# Patient Record
Sex: FEMALE | Race: BLACK OR AFRICAN AMERICAN | NOT HISPANIC OR LATINO | Employment: OTHER | URBAN - METROPOLITAN AREA
[De-identification: names, ages, dates, MRNs, and addresses within clinical notes are randomized per-mention and may not be internally consistent; named-entity substitution may affect disease eponyms.]

---

## 2024-07-02 ENCOUNTER — HOSPITAL ENCOUNTER (OUTPATIENT)
Dept: GENERAL RADIOLOGY | Facility: HOSPITAL | Age: 69
Discharge: HOME | End: 2024-07-02
Payer: MEDICARE

## 2024-07-02 ENCOUNTER — PRE-ADMISSION TESTING (OUTPATIENT)
Dept: PREADMISSION TESTING | Facility: HOSPITAL | Age: 69
End: 2024-07-02
Payer: MEDICARE

## 2024-07-02 VITALS
TEMPERATURE: 97.8 F | RESPIRATION RATE: 16 BRPM | BODY MASS INDEX: 45.81 KG/M2 | HEART RATE: 61 BPM | OXYGEN SATURATION: 96 % | DIASTOLIC BLOOD PRESSURE: 78 MMHG | HEIGHT: 67 IN | SYSTOLIC BLOOD PRESSURE: 137 MMHG | WEIGHT: 291.9 LBS

## 2024-07-02 LAB
ALBUMIN SERPL-MCNC: 3.9 G/DL (ref 3.5–5.2)
ALBUMIN/GLOB SERPL: 1.3 G/DL
ALP SERPL-CCNC: 93 U/L (ref 39–117)
ALT SERPL W P-5'-P-CCNC: 14 U/L (ref 1–33)
ANION GAP SERPL CALCULATED.3IONS-SCNC: 10.1 MMOL/L (ref 5–15)
AST SERPL-CCNC: 18 U/L (ref 1–32)
BACTERIA UR QL AUTO: NORMAL /HPF
BILIRUB SERPL-MCNC: 0.4 MG/DL (ref 0–1.2)
BILIRUB UR QL STRIP: NEGATIVE
BUN SERPL-MCNC: 18 MG/DL (ref 8–23)
BUN/CREAT SERPL: 12.3 (ref 7–25)
CALCIUM SPEC-SCNC: 9.4 MG/DL (ref 8.6–10.5)
CHLORIDE SERPL-SCNC: 103 MMOL/L (ref 98–107)
CLARITY UR: CLEAR
CO2 SERPL-SCNC: 26.9 MMOL/L (ref 22–29)
COLOR UR: YELLOW
CREAT SERPL-MCNC: 1.46 MG/DL (ref 0.57–1)
DEPRECATED RDW RBC AUTO: 47.6 FL (ref 37–54)
EGFRCR SERPLBLD CKD-EPI 2021: 39.3 ML/MIN/1.73
ERYTHROCYTE [DISTWIDTH] IN BLOOD BY AUTOMATED COUNT: 15.8 % (ref 12.3–15.4)
GLOBULIN UR ELPH-MCNC: 3 GM/DL
GLUCOSE SERPL-MCNC: 87 MG/DL (ref 65–99)
GLUCOSE UR STRIP-MCNC: NEGATIVE MG/DL
HCT VFR BLD AUTO: 35.1 % (ref 34–46.6)
HGB BLD-MCNC: 11.4 G/DL (ref 12–15.9)
HGB UR QL STRIP.AUTO: NEGATIVE
HYALINE CASTS UR QL AUTO: NORMAL /LPF
INR PPP: 1.05 (ref 0.9–1.1)
KETONES UR QL STRIP: ABNORMAL
LEUKOCYTE ESTERASE UR QL STRIP.AUTO: ABNORMAL
MCH RBC QN AUTO: 27 PG (ref 26.6–33)
MCHC RBC AUTO-ENTMCNC: 32.5 G/DL (ref 31.5–35.7)
MCV RBC AUTO: 83.2 FL (ref 79–97)
NITRITE UR QL STRIP: NEGATIVE
PH UR STRIP.AUTO: 5.5 [PH] (ref 5–8)
PLATELET # BLD AUTO: 237 10*3/MM3 (ref 140–450)
PMV BLD AUTO: 9.7 FL (ref 6–12)
POTASSIUM SERPL-SCNC: 3.8 MMOL/L (ref 3.5–5.2)
PROT SERPL-MCNC: 6.9 G/DL (ref 6–8.5)
PROT UR QL STRIP: NEGATIVE
PROTHROMBIN TIME: 13.9 SECONDS (ref 11.7–14.2)
QT INTERVAL: 480 MS
QTC INTERVAL: 425 MS
RBC # BLD AUTO: 4.22 10*6/MM3 (ref 3.77–5.28)
RBC # UR STRIP: NORMAL /HPF
REF LAB TEST METHOD: NORMAL
SODIUM SERPL-SCNC: 140 MMOL/L (ref 136–145)
SP GR UR STRIP: 1.02 (ref 1–1.03)
SQUAMOUS #/AREA URNS HPF: NORMAL /HPF
UROBILINOGEN UR QL STRIP: ABNORMAL
WBC # UR STRIP: NORMAL /HPF
WBC NRBC COR # BLD AUTO: 4.64 10*3/MM3 (ref 3.4–10.8)

## 2024-07-02 PROCEDURE — 81001 URINALYSIS AUTO W/SCOPE: CPT

## 2024-07-02 PROCEDURE — 85027 COMPLETE CBC AUTOMATED: CPT

## 2024-07-02 PROCEDURE — 73560 X-RAY EXAM OF KNEE 1 OR 2: CPT

## 2024-07-02 PROCEDURE — 93010 ELECTROCARDIOGRAM REPORT: CPT | Performed by: INTERNAL MEDICINE

## 2024-07-02 PROCEDURE — 93005 ELECTROCARDIOGRAM TRACING: CPT

## 2024-07-02 PROCEDURE — 36415 COLL VENOUS BLD VENIPUNCTURE: CPT

## 2024-07-02 PROCEDURE — 80053 COMPREHEN METABOLIC PANEL: CPT

## 2024-07-02 PROCEDURE — 85610 PROTHROMBIN TIME: CPT

## 2024-07-02 PROCEDURE — 71046 X-RAY EXAM CHEST 2 VIEWS: CPT

## 2024-07-02 RX ORDER — MELOXICAM 15 MG/1
1 TABLET ORAL
COMMUNITY

## 2024-07-02 RX ORDER — TORSEMIDE 20 MG/1
60 TABLET ORAL DAILY
COMMUNITY

## 2024-07-02 RX ORDER — BUDESONIDE AND FORMOTEROL FUMARATE DIHYDRATE 80; 4.5 UG/1; UG/1
2 AEROSOL RESPIRATORY (INHALATION)
COMMUNITY
Start: 2024-05-17

## 2024-07-02 RX ORDER — LETROZOLE 2.5 MG/1
1 TABLET, FILM COATED ORAL DAILY
COMMUNITY
Start: 2024-05-22

## 2024-07-02 RX ORDER — FAMOTIDINE 40 MG/1
40 TABLET, FILM COATED ORAL AS NEEDED
COMMUNITY

## 2024-07-02 RX ORDER — LEVOTHYROXINE SODIUM 0.07 MG/1
75 TABLET ORAL DAILY
COMMUNITY

## 2024-07-02 RX ORDER — ERGOCALCIFEROL 1.25 MG/1
50000 CAPSULE ORAL
COMMUNITY
Start: 2024-06-25

## 2024-07-02 RX ORDER — NEBIVOLOL 10 MG/1
10 TABLET ORAL DAILY
COMMUNITY

## 2024-07-02 RX ORDER — ESCITALOPRAM OXALATE 20 MG/1
20 TABLET ORAL DAILY
COMMUNITY

## 2024-07-02 NOTE — DISCHARGE INSTRUCTIONS
Take the following medications the morning of surgery:    SYMBICORT, LEXAPRO, SYNTHROID, BYSTOLIC    If you are on prescription narcotic pain medication to control your pain you may also take that medication the morning of surgery.    General Instructions:  Do not eat solid food after midnight the night before surgery.  You may drink clear liquids day of surgery but must stop at least one hour before your hospital arrival time.  It is beneficial for you to have a clear drink that contains carbohydrates the day of surgery.  We suggest a 12 to 20 ounce bottle of Gatorade or Powerade for non-diabetic patients     Clear liquids are liquids you can see through.  Nothing red in color.     Plain water                               Sports drinks  Sodas                                   Gelatin (Jell-O)  Fruit juices without pulp such as white grape juice and apple juice  Popsicles that contain no fruit or yogurt  Tea or coffee (no cream or milk added)  Gatorade / Powerade  G2 / Powerade Zero    Patients who avoid smoking, chewing tobacco and alcohol for 4 weeks prior to surgery have a reduced risk of post-operative complications.  Quit smoking as many days before surgery as you can.  Do not smoke, use chewing tobacco or drink alcohol the day of surgery.   Bring any papers given to you in the doctor’s office.  Wear clean comfortable clothes.  Do not wear contact lenses, false eyelashes or make-up.  Bring a case for your glasses.   Bring  walker if applicable.  Remove all piercings.  Leave jewelry and any other valuables at home.  Hair extensions with metal clips must be removed prior to surgery.  The Pre-Admission Testing nurse will instruct you to bring medications if unable to obtain an accurate list in Pre-Admission Testing.            Preventing a Surgical Site Infection:  For 2 to 3 days before surgery, avoid shaving with a razor because the razor can irritate skin and make it easier to develop an infection.    Any  areas of open skin can increase the risk of a post-operative wound infection by allowing bacteria to enter and travel throughout the body.  Notify your surgeon if you have any skin wounds / rashes even if it is not near the expected surgical site.  The area will need assessed to determine if surgery should be delayed until it is healed.  The night prior to surgery shower using a fresh bar of anti-bacterial soap (such as Dial) and clean washcloth.  Sleep in a clean bed with clean clothing.  Do not allow pets to sleep with you.  Shower on the morning of surgery using a fresh bar of anti-bacterial soap (such as Dial) and clean washcloth.  Dry with a clean towel and dress in clean clothing.  Ask your surgeon if you will be receiving antibiotics prior to surgery.  Make sure you, your family, and all healthcare providers clean their hands with soap and water or an alcohol based hand  before caring for you or your wound.    Day of surgery:  Your arrival time is approximately two hours before your scheduled surgery time.  Upon arrival, a Pre-op nurse and Anesthesiologist will review your health history, obtain vital signs, and answer questions you may have.  The only belongings needed at this time will be a list of your home medications and if applicable your C-PAP/BI-PAP machine.  A Pre-op nurse will start an IV and you may receive medication in preparation for surgery, including something to help you relax.     Please be aware that surgery does come with discomfort.  We want to make every effort to control your discomfort so please discuss any uncontrolled symptoms with your nurse.   Your doctor will most likely have prescribed pain medications.      CHLORHEXIDINE CLOTH INSTRUCTIONS  The morning of surgery follow these instructions using the Chlorhexidine cloths you've been given.  These steps reduce bacteria on the body.  Do not use the cloths near your eyes, ears mouth, genitalia or on open wounds.  Throw the  cloths away after use but do not try to flush them down a toilet.      Open and remove one cloth at a time from the package.    Leave the cloth unfolded and begin the bathing.  Massage the skin with the cloths using gentle pressure to remove bacteria.  Do not scrub harshly.   Follow the steps below with one 2% CHG cloth per area (6 total cloths).  One cloth for neck, shoulders and chest.  One cloth for both arms, hands, fingers and underarms (do underarms last).  One cloth for the abdomen followed by groin.  One cloth for right leg and foot including between the toes.  One cloth for left leg and foot including between the toes.  The last cloth is to be used for the back of the neck, back and buttocks.    Allow the CHG to air dry 3 minutes on the skin which will give it time to work and decrease the chance of irritation.  The skin may feel sticky until it is dry.  Do not rinse with water or any other liquid or you will lose the beneficial effects of the CHG.  If mild skin irritation occurs, do rinse the skin to remove the CHG.  Report this to the nurse at time of admission.  Do not apply lotions, creams, ointments, deodorants or perfumes after using the clothes. Dress in clean clothes before coming to the hospital.    If you are staying overnight following surgery, you will be transported to your hospital room following the recovery period.  [facility] has all private rooms.    If you have any questions please call Pre-Admission Testing at [phone].  Deductibles and co-payments are collected on the day of service. Please be prepared to pay the required co-pay, deductible or deposit on the day of service as defined by your plan.    Call your surgeon immediately if you experience any of the following symptoms:  Sore Throat  Shortness of Breath or difficulty breathing  Cough  Chills  Body soreness or muscle pain  Headache  Fever  New loss of taste or smell  Do not arrive for your surgery ill.  Your  procedure will need to be rescheduled to another time.  You will need to call your physician before the day of surgery to avoid any unnecessary exposure to hospital staff as well as other patients.

## 2024-07-03 ENCOUNTER — ANESTHESIA EVENT (OUTPATIENT)
Dept: PERIOP | Facility: HOSPITAL | Age: 69
End: 2024-07-03
Payer: MEDICARE

## 2024-07-16 ENCOUNTER — ANESTHESIA (OUTPATIENT)
Dept: PERIOP | Facility: HOSPITAL | Age: 69
End: 2024-07-16
Payer: MEDICARE

## 2024-07-16 ENCOUNTER — APPOINTMENT (OUTPATIENT)
Dept: GENERAL RADIOLOGY | Facility: HOSPITAL | Age: 69
End: 2024-07-16
Payer: MEDICARE

## 2024-07-16 ENCOUNTER — HOSPITAL ENCOUNTER (OUTPATIENT)
Facility: HOSPITAL | Age: 69
Setting detail: OBSERVATION
Discharge: HOME | End: 2024-07-18
Attending: ORTHOPAEDIC SURGERY | Admitting: ORTHOPAEDIC SURGERY
Payer: MEDICARE

## 2024-07-16 DIAGNOSIS — M17.12 PRIMARY OSTEOARTHRITIS OF LEFT KNEE: Primary | ICD-10-CM

## 2024-07-16 PROCEDURE — G0378 HOSPITAL OBSERVATION PER HR: HCPCS

## 2024-07-16 PROCEDURE — 25010000002 ROPIVACAINE PER 1 MG: Performed by: ORTHOPAEDIC SURGERY

## 2024-07-16 PROCEDURE — C1776 JOINT DEVICE (IMPLANTABLE): HCPCS | Performed by: ORTHOPAEDIC SURGERY

## 2024-07-16 PROCEDURE — 25010000002 ONDANSETRON PER 1 MG: Performed by: NURSE ANESTHETIST, CERTIFIED REGISTERED

## 2024-07-16 PROCEDURE — A9270 NON-COVERED ITEM OR SERVICE: HCPCS | Performed by: NURSE ANESTHETIST, CERTIFIED REGISTERED

## 2024-07-16 PROCEDURE — A9270 NON-COVERED ITEM OR SERVICE: HCPCS | Performed by: ORTHOPAEDIC SURGERY

## 2024-07-16 PROCEDURE — 25010000002 SUGAMMADEX 200 MG/2ML SOLUTION: Performed by: ANESTHESIOLOGY

## 2024-07-16 PROCEDURE — 25010000002 CLINDAMYCIN 900 MG/50ML SOLUTION: Performed by: ORTHOPAEDIC SURGERY

## 2024-07-16 PROCEDURE — 63710000001 OXYCODONE-ACETAMINOPHEN 5-325 MG TABLET: Performed by: ORTHOPAEDIC SURGERY

## 2024-07-16 PROCEDURE — 25010000002 HYDROMORPHONE 1 MG/ML SOLUTION: Performed by: ANESTHESIOLOGY

## 2024-07-16 PROCEDURE — 25010000002 DEXAMETHASONE PER 1 MG: Performed by: NURSE ANESTHETIST, CERTIFIED REGISTERED

## 2024-07-16 PROCEDURE — 25010000002 FENTANYL CITRATE (PF) 50 MCG/ML SOLUTION: Performed by: NURSE ANESTHETIST, CERTIFIED REGISTERED

## 2024-07-16 PROCEDURE — C1713 ANCHOR/SCREW BN/BN,TIS/BN: HCPCS | Performed by: ORTHOPAEDIC SURGERY

## 2024-07-16 PROCEDURE — 25010000002 PROPOFOL 10 MG/ML EMULSION: Performed by: NURSE ANESTHETIST, CERTIFIED REGISTERED

## 2024-07-16 PROCEDURE — 63710000001 HYDROCODONE-ACETAMINOPHEN 5-325 MG TABLET: Performed by: NURSE ANESTHETIST, CERTIFIED REGISTERED

## 2024-07-16 PROCEDURE — 25010000002 FENTANYL CITRATE (PF) 50 MCG/ML SOLUTION: Performed by: STUDENT IN AN ORGANIZED HEALTH CARE EDUCATION/TRAINING PROGRAM

## 2024-07-16 PROCEDURE — 25010000002 MORPHINE PER 10 MG: Performed by: ORTHOPAEDIC SURGERY

## 2024-07-16 PROCEDURE — 25010000002 MIDAZOLAM PER 1 MG: Performed by: STUDENT IN AN ORGANIZED HEALTH CARE EDUCATION/TRAINING PROGRAM

## 2024-07-16 PROCEDURE — 25010000002 EPINEPHRINE 1 MG/ML SOLUTION 30 ML VIAL: Performed by: ORTHOPAEDIC SURGERY

## 2024-07-16 PROCEDURE — 73560 X-RAY EXAM OF KNEE 1 OR 2: CPT

## 2024-07-16 PROCEDURE — 25010000002 KETOROLAC TROMETHAMINE PER 15 MG: Performed by: ORTHOPAEDIC SURGERY

## 2024-07-16 PROCEDURE — 25010000002 ONDANSETRON PER 1 MG: Performed by: ANESTHESIOLOGY

## 2024-07-16 PROCEDURE — 25010000002 HYDROMORPHONE PER 4 MG: Performed by: NURSE ANESTHETIST, CERTIFIED REGISTERED

## 2024-07-16 PROCEDURE — 25810000003 LACTATED RINGERS PER 1000 ML: Performed by: STUDENT IN AN ORGANIZED HEALTH CARE EDUCATION/TRAINING PROGRAM

## 2024-07-16 DEVICE — IMPLANTABLE DEVICE
Type: IMPLANTABLE DEVICE | Site: KNEE | Status: FUNCTIONAL
Brand: BIOMET® KNEE SYSTEM

## 2024-07-16 DEVICE — IMPLANTABLE DEVICE
Type: IMPLANTABLE DEVICE | Site: KNEE | Status: FUNCTIONAL
Brand: VANGUARD® KNEE SYSTEM

## 2024-07-16 DEVICE — IMPLANTABLE DEVICE
Type: IMPLANTABLE DEVICE | Site: KNEE | Status: FUNCTIONAL
Brand: BIOMET® BONE CEMENT R

## 2024-07-16 DEVICE — CAP TOTL KN CMT PRIMARY: Type: IMPLANTABLE DEVICE | Status: FUNCTIONAL

## 2024-07-16 RX ORDER — ONDANSETRON 2 MG/ML
4 INJECTION INTRAMUSCULAR; INTRAVENOUS ONCE AS NEEDED
Status: COMPLETED | OUTPATIENT
Start: 2024-07-16 | End: 2024-07-16

## 2024-07-16 RX ORDER — OXYCODONE HYDROCHLORIDE AND ACETAMINOPHEN 5; 325 MG/1; MG/1
2 TABLET ORAL EVERY 4 HOURS PRN
Status: DISCONTINUED | OUTPATIENT
Start: 2024-07-16 | End: 2024-07-18 | Stop reason: HOSPADM

## 2024-07-16 RX ORDER — BUDESONIDE AND FORMOTEROL FUMARATE DIHYDRATE 160; 4.5 UG/1; UG/1
2 AEROSOL RESPIRATORY (INHALATION)
Status: DISCONTINUED | OUTPATIENT
Start: 2024-07-16 | End: 2024-07-16

## 2024-07-16 RX ORDER — NALOXONE HCL 0.4 MG/ML
0.2 VIAL (ML) INJECTION AS NEEDED
Status: DISCONTINUED | OUTPATIENT
Start: 2024-07-16 | End: 2024-07-16 | Stop reason: HOSPADM

## 2024-07-16 RX ORDER — CETIRIZINE HYDROCHLORIDE 10 MG/1
10 TABLET ORAL DAILY
Status: DISCONTINUED | OUTPATIENT
Start: 2024-07-16 | End: 2024-07-18 | Stop reason: HOSPADM

## 2024-07-16 RX ORDER — ACETAMINOPHEN 500 MG
1000 TABLET ORAL ONCE
Status: COMPLETED | OUTPATIENT
Start: 2024-07-16 | End: 2024-07-16

## 2024-07-16 RX ORDER — SODIUM CHLORIDE 0.9 % (FLUSH) 0.9 %
10 SYRINGE (ML) INJECTION EVERY 12 HOURS SCHEDULED
Status: DISCONTINUED | OUTPATIENT
Start: 2024-07-16 | End: 2024-07-18 | Stop reason: HOSPADM

## 2024-07-16 RX ORDER — BUDESONIDE AND FORMOTEROL FUMARATE DIHYDRATE 160; 4.5 UG/1; UG/1
2 AEROSOL RESPIRATORY (INHALATION)
Status: DISCONTINUED | OUTPATIENT
Start: 2024-07-16 | End: 2024-07-18 | Stop reason: HOSPADM

## 2024-07-16 RX ORDER — NEBIVOLOL 10 MG/1
10 TABLET ORAL DAILY
Status: DISCONTINUED | OUTPATIENT
Start: 2024-07-17 | End: 2024-07-18 | Stop reason: HOSPADM

## 2024-07-16 RX ORDER — DIAZEPAM 5 MG/1
5 TABLET ORAL 2 TIMES DAILY PRN
Status: DISCONTINUED | OUTPATIENT
Start: 2024-07-16 | End: 2024-07-18 | Stop reason: HOSPADM

## 2024-07-16 RX ORDER — HYDROCODONE BITARTRATE AND ACETAMINOPHEN 5; 325 MG/1; MG/1
1 TABLET ORAL ONCE AS NEEDED
Status: COMPLETED | OUTPATIENT
Start: 2024-07-16 | End: 2024-07-16

## 2024-07-16 RX ORDER — SODIUM CHLORIDE 0.9 % (FLUSH) 0.9 %
3 SYRINGE (ML) INJECTION EVERY 12 HOURS SCHEDULED
Status: DISCONTINUED | OUTPATIENT
Start: 2024-07-16 | End: 2024-07-16 | Stop reason: HOSPADM

## 2024-07-16 RX ORDER — SODIUM CHLORIDE 450 MG/100ML
100 INJECTION, SOLUTION INTRAVENOUS CONTINUOUS
Status: DISCONTINUED | OUTPATIENT
Start: 2024-07-16 | End: 2024-07-18 | Stop reason: HOSPADM

## 2024-07-16 RX ORDER — HYDRALAZINE HYDROCHLORIDE 20 MG/ML
5 INJECTION INTRAMUSCULAR; INTRAVENOUS
Status: DISCONTINUED | OUTPATIENT
Start: 2024-07-16 | End: 2024-07-16 | Stop reason: HOSPADM

## 2024-07-16 RX ORDER — OXYCODONE HYDROCHLORIDE AND ACETAMINOPHEN 5; 325 MG/1; MG/1
1 TABLET ORAL EVERY 4 HOURS PRN
Status: DISCONTINUED | OUTPATIENT
Start: 2024-07-16 | End: 2024-07-18 | Stop reason: HOSPADM

## 2024-07-16 RX ORDER — FERROUS SULFATE 325(65) MG
325 TABLET ORAL
Status: DISCONTINUED | OUTPATIENT
Start: 2024-07-17 | End: 2024-07-18 | Stop reason: HOSPADM

## 2024-07-16 RX ORDER — CLINDAMYCIN PHOSPHATE 900 MG/50ML
900 INJECTION, SOLUTION INTRAVENOUS ONCE
Status: COMPLETED | OUTPATIENT
Start: 2024-07-16 | End: 2024-07-16

## 2024-07-16 RX ORDER — DEXAMETHASONE SODIUM PHOSPHATE 4 MG/ML
INJECTION, SOLUTION INTRA-ARTICULAR; INTRALESIONAL; INTRAMUSCULAR; INTRAVENOUS; SOFT TISSUE AS NEEDED
Status: DISCONTINUED | OUTPATIENT
Start: 2024-07-16 | End: 2024-07-16 | Stop reason: SURG

## 2024-07-16 RX ORDER — PROMETHAZINE HYDROCHLORIDE 12.5 MG/1
12.5 TABLET ORAL EVERY 6 HOURS PRN
Status: DISCONTINUED | OUTPATIENT
Start: 2024-07-16 | End: 2024-07-18 | Stop reason: HOSPADM

## 2024-07-16 RX ORDER — PROMETHAZINE HYDROCHLORIDE 25 MG/1
25 TABLET ORAL ONCE AS NEEDED
Status: DISCONTINUED | OUTPATIENT
Start: 2024-07-16 | End: 2024-07-16 | Stop reason: HOSPADM

## 2024-07-16 RX ORDER — DIPHENHYDRAMINE HYDROCHLORIDE 50 MG/ML
12.5 INJECTION INTRAMUSCULAR; INTRAVENOUS EVERY 6 HOURS PRN
Status: DISCONTINUED | OUTPATIENT
Start: 2024-07-16 | End: 2024-07-18 | Stop reason: HOSPADM

## 2024-07-16 RX ORDER — DOCUSATE SODIUM 100 MG/1
100 CAPSULE, LIQUID FILLED ORAL 2 TIMES DAILY PRN
Status: DISCONTINUED | OUTPATIENT
Start: 2024-07-16 | End: 2024-07-18 | Stop reason: HOSPADM

## 2024-07-16 RX ORDER — DIPHENHYDRAMINE HYDROCHLORIDE 50 MG/ML
12.5 INJECTION INTRAMUSCULAR; INTRAVENOUS
Status: DISCONTINUED | OUTPATIENT
Start: 2024-07-16 | End: 2024-07-16 | Stop reason: HOSPADM

## 2024-07-16 RX ORDER — EPHEDRINE SULFATE 50 MG/ML
5 INJECTION, SOLUTION INTRAVENOUS ONCE AS NEEDED
Status: DISCONTINUED | OUTPATIENT
Start: 2024-07-16 | End: 2024-07-16 | Stop reason: HOSPADM

## 2024-07-16 RX ORDER — ASPIRIN 325 MG
325 TABLET, DELAYED RELEASE (ENTERIC COATED) ORAL 2 TIMES DAILY WITH MEALS
Qty: 60 TABLET | Refills: 0 | Status: SHIPPED | OUTPATIENT
Start: 2024-07-16

## 2024-07-16 RX ORDER — DROPERIDOL 2.5 MG/ML
0.62 INJECTION, SOLUTION INTRAMUSCULAR; INTRAVENOUS
Status: DISCONTINUED | OUTPATIENT
Start: 2024-07-16 | End: 2024-07-16 | Stop reason: HOSPADM

## 2024-07-16 RX ORDER — NALOXONE HCL 0.4 MG/ML
0.4 VIAL (ML) INJECTION
Status: DISCONTINUED | OUTPATIENT
Start: 2024-07-16 | End: 2024-07-18 | Stop reason: HOSPADM

## 2024-07-16 RX ORDER — ROCURONIUM BROMIDE 10 MG/ML
INJECTION, SOLUTION INTRAVENOUS AS NEEDED
Status: DISCONTINUED | OUTPATIENT
Start: 2024-07-16 | End: 2024-07-16 | Stop reason: SURG

## 2024-07-16 RX ORDER — TRANEXAMIC ACID 100 MG/ML
INJECTION, SOLUTION INTRAVENOUS AS NEEDED
Status: DISCONTINUED | OUTPATIENT
Start: 2024-07-16 | End: 2024-07-16 | Stop reason: SURG

## 2024-07-16 RX ORDER — LIDOCAINE HYDROCHLORIDE 20 MG/ML
INJECTION, SOLUTION INFILTRATION; PERINEURAL AS NEEDED
Status: DISCONTINUED | OUTPATIENT
Start: 2024-07-16 | End: 2024-07-16 | Stop reason: SURG

## 2024-07-16 RX ORDER — LEVOTHYROXINE SODIUM 0.07 MG/1
75 TABLET ORAL
Status: DISCONTINUED | OUTPATIENT
Start: 2024-07-17 | End: 2024-07-18 | Stop reason: HOSPADM

## 2024-07-16 RX ORDER — NIFEDIPINE 60 MG/1
60 TABLET, EXTENDED RELEASE ORAL DAILY
Status: DISCONTINUED | OUTPATIENT
Start: 2024-07-17 | End: 2024-07-18 | Stop reason: HOSPADM

## 2024-07-16 RX ORDER — MIDAZOLAM HYDROCHLORIDE 1 MG/ML
0.5 INJECTION INTRAMUSCULAR; INTRAVENOUS
Status: DISCONTINUED | OUTPATIENT
Start: 2024-07-16 | End: 2024-07-16 | Stop reason: HOSPADM

## 2024-07-16 RX ORDER — ACETAMINOPHEN 325 MG/1
325 TABLET ORAL EVERY 4 HOURS PRN
Status: DISCONTINUED | OUTPATIENT
Start: 2024-07-16 | End: 2024-07-18 | Stop reason: HOSPADM

## 2024-07-16 RX ORDER — DOCUSATE SODIUM 250 MG
250 CAPSULE ORAL 2 TIMES DAILY PRN
Qty: 30 CAPSULE | Refills: 1 | Status: SHIPPED | OUTPATIENT
Start: 2024-07-16

## 2024-07-16 RX ORDER — ESCITALOPRAM OXALATE 20 MG/1
20 TABLET ORAL DAILY
Status: DISCONTINUED | OUTPATIENT
Start: 2024-07-16 | End: 2024-07-18 | Stop reason: HOSPADM

## 2024-07-16 RX ORDER — OXYCODONE HYDROCHLORIDE AND ACETAMINOPHEN 5; 325 MG/1; MG/1
1-2 TABLET ORAL EVERY 4 HOURS PRN
Qty: 42 TABLET | Refills: 0 | Status: SHIPPED | OUTPATIENT
Start: 2024-07-16

## 2024-07-16 RX ORDER — FENTANYL CITRATE 50 UG/ML
50 INJECTION, SOLUTION INTRAMUSCULAR; INTRAVENOUS ONCE AS NEEDED
Status: COMPLETED | OUTPATIENT
Start: 2024-07-16 | End: 2024-07-16

## 2024-07-16 RX ORDER — LIDOCAINE HYDROCHLORIDE 10 MG/ML
0.5 INJECTION, SOLUTION INFILTRATION; PERINEURAL ONCE AS NEEDED
Status: DISCONTINUED | OUTPATIENT
Start: 2024-07-16 | End: 2024-07-16 | Stop reason: HOSPADM

## 2024-07-16 RX ORDER — ONDANSETRON 4 MG/1
4 TABLET, ORALLY DISINTEGRATING ORAL EVERY 6 HOURS PRN
Status: DISCONTINUED | OUTPATIENT
Start: 2024-07-16 | End: 2024-07-18 | Stop reason: HOSPADM

## 2024-07-16 RX ORDER — ONDANSETRON 2 MG/ML
4 INJECTION INTRAMUSCULAR; INTRAVENOUS EVERY 6 HOURS PRN
Status: DISCONTINUED | OUTPATIENT
Start: 2024-07-16 | End: 2024-07-18 | Stop reason: HOSPADM

## 2024-07-16 RX ORDER — FENTANYL CITRATE 50 UG/ML
50 INJECTION, SOLUTION INTRAMUSCULAR; INTRAVENOUS
Status: DISCONTINUED | OUTPATIENT
Start: 2024-07-16 | End: 2024-07-16 | Stop reason: HOSPADM

## 2024-07-16 RX ORDER — ONDANSETRON 2 MG/ML
INJECTION INTRAMUSCULAR; INTRAVENOUS AS NEEDED
Status: DISCONTINUED | OUTPATIENT
Start: 2024-07-16 | End: 2024-07-16 | Stop reason: SURG

## 2024-07-16 RX ORDER — CLINDAMYCIN PHOSPHATE 900 MG/50ML
900 INJECTION, SOLUTION INTRAVENOUS EVERY 8 HOURS
Qty: 100 ML | Refills: 0 | Status: COMPLETED | OUTPATIENT
Start: 2024-07-16 | End: 2024-07-17

## 2024-07-16 RX ORDER — SODIUM CHLORIDE 0.9 % (FLUSH) 0.9 %
3-10 SYRINGE (ML) INJECTION AS NEEDED
Status: DISCONTINUED | OUTPATIENT
Start: 2024-07-16 | End: 2024-07-16 | Stop reason: HOSPADM

## 2024-07-16 RX ORDER — TORSEMIDE 20 MG/1
60 TABLET ORAL DAILY
Status: DISCONTINUED | OUTPATIENT
Start: 2024-07-16 | End: 2024-07-18 | Stop reason: HOSPADM

## 2024-07-16 RX ORDER — CELECOXIB 200 MG/1
200 CAPSULE ORAL ONCE
Status: DISCONTINUED | OUTPATIENT
Start: 2024-07-16 | End: 2024-07-16

## 2024-07-16 RX ORDER — SODIUM CHLORIDE 0.9 % (FLUSH) 0.9 %
1-10 SYRINGE (ML) INJECTION AS NEEDED
Status: DISCONTINUED | OUTPATIENT
Start: 2024-07-16 | End: 2024-07-18 | Stop reason: HOSPADM

## 2024-07-16 RX ORDER — MAGNESIUM HYDROXIDE 1200 MG/15ML
LIQUID ORAL AS NEEDED
Status: DISCONTINUED | OUTPATIENT
Start: 2024-07-16 | End: 2024-07-16 | Stop reason: HOSPADM

## 2024-07-16 RX ORDER — FLUMAZENIL 0.1 MG/ML
0.2 INJECTION INTRAVENOUS AS NEEDED
Status: DISCONTINUED | OUTPATIENT
Start: 2024-07-16 | End: 2024-07-16 | Stop reason: HOSPADM

## 2024-07-16 RX ORDER — SODIUM CHLORIDE, SODIUM LACTATE, POTASSIUM CHLORIDE, CALCIUM CHLORIDE 600; 310; 30; 20 MG/100ML; MG/100ML; MG/100ML; MG/100ML
9 INJECTION, SOLUTION INTRAVENOUS CONTINUOUS
Status: DISCONTINUED | OUTPATIENT
Start: 2024-07-16 | End: 2024-07-18 | Stop reason: HOSPADM

## 2024-07-16 RX ORDER — PROPOFOL 10 MG/ML
VIAL (ML) INTRAVENOUS AS NEEDED
Status: DISCONTINUED | OUTPATIENT
Start: 2024-07-16 | End: 2024-07-16 | Stop reason: SURG

## 2024-07-16 RX ORDER — LETROZOLE 2.5 MG/1
2.5 TABLET, FILM COATED ORAL DAILY
Status: DISCONTINUED | OUTPATIENT
Start: 2024-07-16 | End: 2024-07-18 | Stop reason: HOSPADM

## 2024-07-16 RX ORDER — KETOROLAC TROMETHAMINE 15 MG/ML
15 INJECTION, SOLUTION INTRAMUSCULAR; INTRAVENOUS EVERY 6 HOURS PRN
Status: DISCONTINUED | OUTPATIENT
Start: 2024-07-16 | End: 2024-07-18 | Stop reason: HOSPADM

## 2024-07-16 RX ORDER — MORPHINE SULFATE 2 MG/ML
4 INJECTION, SOLUTION INTRAMUSCULAR; INTRAVENOUS
Status: ACTIVE | OUTPATIENT
Start: 2024-07-16 | End: 2024-07-17

## 2024-07-16 RX ORDER — FAMOTIDINE 20 MG/1
40 TABLET, FILM COATED ORAL DAILY PRN
Status: DISCONTINUED | OUTPATIENT
Start: 2024-07-16 | End: 2024-07-18 | Stop reason: HOSPADM

## 2024-07-16 RX ORDER — SODIUM CHLORIDE 9 MG/ML
40 INJECTION, SOLUTION INTRAVENOUS AS NEEDED
Status: DISCONTINUED | OUTPATIENT
Start: 2024-07-16 | End: 2024-07-18 | Stop reason: HOSPADM

## 2024-07-16 RX ORDER — ASPIRIN 325 MG
325 TABLET, DELAYED RELEASE (ENTERIC COATED) ORAL 2 TIMES DAILY WITH MEALS
Status: DISCONTINUED | OUTPATIENT
Start: 2024-07-17 | End: 2024-07-18 | Stop reason: HOSPADM

## 2024-07-16 RX ORDER — OXYCODONE AND ACETAMINOPHEN 7.5; 325 MG/1; MG/1
1 TABLET ORAL EVERY 4 HOURS PRN
Status: DISCONTINUED | OUTPATIENT
Start: 2024-07-16 | End: 2024-07-16 | Stop reason: HOSPADM

## 2024-07-16 RX ORDER — DIPHENHYDRAMINE HCL 25 MG
25 CAPSULE ORAL EVERY 6 HOURS PRN
Status: DISCONTINUED | OUTPATIENT
Start: 2024-07-16 | End: 2024-07-18 | Stop reason: HOSPADM

## 2024-07-16 RX ORDER — HYDROMORPHONE HYDROCHLORIDE 1 MG/ML
0.5 INJECTION, SOLUTION INTRAMUSCULAR; INTRAVENOUS; SUBCUTANEOUS
Status: DISCONTINUED | OUTPATIENT
Start: 2024-07-16 | End: 2024-07-16 | Stop reason: HOSPADM

## 2024-07-16 RX ORDER — PROMETHAZINE HYDROCHLORIDE 25 MG/1
25 SUPPOSITORY RECTAL ONCE AS NEEDED
Status: DISCONTINUED | OUTPATIENT
Start: 2024-07-16 | End: 2024-07-16 | Stop reason: HOSPADM

## 2024-07-16 RX ORDER — LABETALOL HYDROCHLORIDE 5 MG/ML
5 INJECTION, SOLUTION INTRAVENOUS
Status: DISCONTINUED | OUTPATIENT
Start: 2024-07-16 | End: 2024-07-16 | Stop reason: HOSPADM

## 2024-07-16 RX ORDER — IPRATROPIUM BROMIDE AND ALBUTEROL SULFATE 2.5; .5 MG/3ML; MG/3ML
3 SOLUTION RESPIRATORY (INHALATION) ONCE AS NEEDED
Status: DISCONTINUED | OUTPATIENT
Start: 2024-07-16 | End: 2024-07-16 | Stop reason: HOSPADM

## 2024-07-16 RX ORDER — NIFEDIPINE 60 MG/1
60 TABLET, EXTENDED RELEASE ORAL DAILY
COMMUNITY

## 2024-07-16 RX ORDER — FENTANYL CITRATE 50 UG/ML
INJECTION, SOLUTION INTRAMUSCULAR; INTRAVENOUS AS NEEDED
Status: DISCONTINUED | OUTPATIENT
Start: 2024-07-16 | End: 2024-07-16 | Stop reason: SURG

## 2024-07-16 RX ADMIN — PROPOFOL 140 MG: 10 INJECTION, EMULSION INTRAVENOUS at 13:38

## 2024-07-16 RX ADMIN — PROPOFOL 25 MCG/KG/MIN: 10 INJECTION, EMULSION INTRAVENOUS at 14:33

## 2024-07-16 RX ADMIN — TRANEXAMIC ACID 1000 MG: 100 INJECTION, SOLUTION INTRAVENOUS at 13:57

## 2024-07-16 RX ADMIN — FENTANYL CITRATE 50 MCG: 50 INJECTION, SOLUTION INTRAMUSCULAR; INTRAVENOUS at 15:52

## 2024-07-16 RX ADMIN — ROCURONIUM BROMIDE 50 MG: 10 INJECTION, SOLUTION INTRAVENOUS at 13:38

## 2024-07-16 RX ADMIN — LIDOCAINE HYDROCHLORIDE 60 MG: 20 INJECTION, SOLUTION INFILTRATION; PERINEURAL at 13:38

## 2024-07-16 RX ADMIN — ONDANSETRON 4 MG: 2 INJECTION INTRAMUSCULAR; INTRAVENOUS at 15:48

## 2024-07-16 RX ADMIN — FENTANYL CITRATE 50 MCG: 50 INJECTION, SOLUTION INTRAMUSCULAR; INTRAVENOUS at 13:59

## 2024-07-16 RX ADMIN — ACETAMINOPHEN 1000 MG: 500 TABLET ORAL at 11:19

## 2024-07-16 RX ADMIN — Medication 10 ML: at 23:49

## 2024-07-16 RX ADMIN — CLINDAMYCIN PHOSPHATE 900 MG: 900 INJECTION, SOLUTION INTRAVENOUS at 13:28

## 2024-07-16 RX ADMIN — FENTANYL CITRATE 50 MCG: 50 INJECTION, SOLUTION INTRAMUSCULAR; INTRAVENOUS at 16:16

## 2024-07-16 RX ADMIN — HYDROMORPHONE HYDROCHLORIDE 0.5 MG: 1 INJECTION, SOLUTION INTRAMUSCULAR; INTRAVENOUS; SUBCUTANEOUS at 16:02

## 2024-07-16 RX ADMIN — ONDANSETRON 4 MG: 2 INJECTION INTRAMUSCULAR; INTRAVENOUS at 14:52

## 2024-07-16 RX ADMIN — KETOROLAC TROMETHAMINE 15 MG: 15 INJECTION, SOLUTION INTRAMUSCULAR; INTRAVENOUS at 20:26

## 2024-07-16 RX ADMIN — FENTANYL CITRATE 50 MCG: 50 INJECTION, SOLUTION INTRAMUSCULAR; INTRAVENOUS at 12:22

## 2024-07-16 RX ADMIN — SODIUM CHLORIDE 100 ML/HR: 4.5 INJECTION, SOLUTION INTRAVENOUS at 20:27

## 2024-07-16 RX ADMIN — SUGAMMADEX 200 MG: 100 INJECTION, SOLUTION INTRAVENOUS at 15:03

## 2024-07-16 RX ADMIN — MIDAZOLAM 0.5 MG: 1 INJECTION INTRAMUSCULAR; INTRAVENOUS at 12:22

## 2024-07-16 RX ADMIN — HYDROCODONE BITARTRATE AND ACETAMINOPHEN 1 TABLET: 5; 325 TABLET ORAL at 16:16

## 2024-07-16 RX ADMIN — OXYCODONE HYDROCHLORIDE AND ACETAMINOPHEN 2 TABLET: 5; 325 TABLET ORAL at 20:27

## 2024-07-16 RX ADMIN — Medication 3 ML: at 13:29

## 2024-07-16 RX ADMIN — CLINDAMYCIN PHOSPHATE 900 MG: 900 INJECTION, SOLUTION INTRAVENOUS at 15:08

## 2024-07-16 RX ADMIN — HYDROMORPHONE HYDROCHLORIDE 0.5 MG: 1 INJECTION, SOLUTION INTRAMUSCULAR; INTRAVENOUS; SUBCUTANEOUS at 14:52

## 2024-07-16 RX ADMIN — DEXAMETHASONE SODIUM PHOSPHATE 8 MG: 4 INJECTION, SOLUTION INTRA-ARTICULAR; INTRALESIONAL; INTRAMUSCULAR; INTRAVENOUS; SOFT TISSUE at 13:54

## 2024-07-16 RX ADMIN — SODIUM CHLORIDE, POTASSIUM CHLORIDE, SODIUM LACTATE AND CALCIUM CHLORIDE 9 ML/HR: 600; 310; 30; 20 INJECTION, SOLUTION INTRAVENOUS at 12:37

## 2024-07-16 NOTE — H&P
"  Orthopaedic Surgery History and Physical    Patient Name:  Gela Holden  YOB: 1955   Age: 68 y.o.  Medical Records Number:  [id number]    Date of Admission:  7/16/2024 10:36 AM    Chief Complaint:  No admission diagnoses are documented for this encounter.    Gela Holden is a 68 y.o. female who presents c/o severe left knee pain.  The pain has been on and off for many years, worsening to the point where the pain is becoming disabling.  The pain is a constant dull ache with occasional sharp, stabbing pain.  The patient has failed conservative treatment and would like to proceed with left total knee arthroplasty.    /78 (BP Location: Left arm)   Pulse 55   Temp 97.5 °F (36.4 °C) (Oral)   Resp 18   Ht 170.2 cm (67.01\")   Wt 132 kg (291 lb 0.1 oz)   SpO2 98%   BMI 45.57 kg/m²     Past Medical History:    Past Medical History:   Diagnosis Date    Anesthesia     DOES NOT LIKE MASK ON FACE    Arthritis     At risk for sleep apnea     STOP BANG 5    Cancer 12/2021    BREAST RT    Cellulitis     Chronic back pain     Depression     Disease of thyroid gland     Exercise intolerance     GERD (gastroesophageal reflux disease)     Hypertension     Knee pain     RONY    Limited joint range of motion     LEFT    Stress incontinence     Varicose veins of left lower extremity     Vitamin D deficiency        Past Surgical History:   Past Surgical History:   Procedure Laterality Date    BREAST BIOPSY Right 12/2021    BREAST LUMPECTOMY Right 02/2022    COLONOSCOPY  2019    TUBAL COAGULATION LAPAROSCOPIC Bilateral     VENTRAL HERNIA REPAIR  03/2015       Social History:    Social History     Socioeconomic History    Marital status: Single   Tobacco Use    Smoking status: Never    Smokeless tobacco: Never   Vaping Use    Vaping status: Never Used   Substance and Sexual Activity    Alcohol use: Not Currently     Comment: OCCAS    Drug use: Never    Sexual activity: Defer       Family History:  "   Family History   Problem Relation Age of Onset    Malig Hyperthermia Neg Hx        Current Medications:  Scheduled Meds:ceFAZolin, 3,000 mg, Intravenous, Once  celecoxib, 200 mg, Oral, Once  ethyl alcohol, 2 Swab, Nasal, Once  ropivacaine 0.5 % 50 mL, Morphine 5 mg, ketorolac 30 mg, EPINEPHrine 0.3 mg in sodium chloride 0.9 % 50 mL solution, , Injection, Once  sodium chloride, 3 mL, Intravenous, Q12H      Continuous Infusions:lactated ringers, 9 mL/hr      PRN Meds:.  fentanyl    lidocaine    midazolam    sodium chloride    Current Facility-Administered Medications:     ceFAZolin 3000 mg IVPB in 100 mL NS (MBP), 3,000 mg, Intravenous, Once, Robert Glover MD    celecoxib (CeleBREX) capsule 200 mg, 200 mg, Oral, Once, Robert Glover MD    ethyl alcohol 62 % 2 each, 2 Swab, Nasal, Once, Robert Glover MD    fentaNYL citrate (PF) (SUBLIMAZE) injection 50 mcg, 50 mcg, Intravenous, Once PRN, González Salguero MD    lactated ringers infusion, 9 mL/hr, Intravenous, Continuous, González Salguero MD    lidocaine (XYLOCAINE) 1 % injection 0.5 mL, 0.5 mL, Intradermal, Once PRN, González Salguero MD    midazolam (VERSED) injection 0.5 mg, 0.5 mg, Intravenous, Q5 Min PRN, González Salguero MD    ropivacaine 0.5 % 50 mL, Morphine 5 mg, ketorolac 30 mg, EPINEPHrine 0.3 mg in sodium chloride 0.9 % 50 mL solution, , Injection, Once, Robert Glover MD    sodium chloride 0.9 % flush 3 mL, 3 mL, Intravenous, Q12H, González Salguero MD    sodium chloride 0.9 % flush 3-10 mL, 3-10 mL, Intravenous, PRN, González Salguero MD    Allergies:    Allergies   Allergen Reactions    Penicillins Anaphylaxis     RASH, SWELLING OF THROAT, SHORTNESS OF BREATH    Sulfa Antibiotics Diarrhea     Had body aches       Review of Systems:   HEENT: Patient denies any headaches, vision changes, change in hearing, or tinnitus, Patient denies any rhinorrhea,epistaxis, sinus pain, mouth or dental problems, sore throat or hoarseness, or  dysphagia  Pulmonary: Patient denies any cough, congestion, SOA, or wheezing  Cardiovascular: Patient denies any chest pain, dyspnea, palpitations, weakness, intolerance of exercise, varicosities, swelling of extremities, known murmur  Gastrointestinal:  Patient denies nausea, vomiting, diarrhea, constipation, loss  of appetite, change in appetite, dysphagia, gas, heartburn, melena, change in bowel habits, use of laxatives or other drugs to alter the function of the gastrointestinal tract.  Genital/Urinary: Patient denies dysuria, change in color of urine, change in frequency of urination, pain with urgency, incontinence, retention, or nocturia.  Musculoskeletal: Patient denies increased warmth; redness; or swelling of joints; limitation of function; deformity; crepitation: pain in a joint or an extremity, the neck, or the back, especially with movement.  Neurological: Patient denies dizziness, tremor, ataxia, difficulty in speaking, change in speech, paresthesia, loss of sensation, seizures, syncope, changes in memory.  Endocrine system: Patient denies tremors, palpitations, intolerance of heat or cold, polyuria, polydipsia, polyphagia, diaphoresis, exophthalmos, or goiter.  Psychological: Patient denies thoughts/plans or harming self or other; depression,  insomnia, night terrors, josette, memory loss, disorientation.  Skin: Patient denies any bruising, rashes, discoloration, pruritus, wounds, ulcers, decubiti, changes in the hair or nails  Hematopoietic: Patient denies history of spontaneous or excessive bleeding, epistaxis, hematuria, melena, fatigue, enlarged or tender lymph nodes, pallor, history of anemia.        Physical Exam:   Awake, A&O x3, affect normal, no acute distress  Ambulating with a limp due to knee pain  Knee ROM is limited due to pain (5-115)  Strength is 4/5 in the quad, hamstring and calf  Cap refill is normal, Sensation intact    Card:  RR, HD Stable  Pulm:  Regular breathing, no S.O.A  Abd:   Soft, NT, ND    Lab Results (last 24 hours)       ** No results found for the last 24 hours. **            XR Chest PA & Lateral    Result Date: 7/4/2024  Narrative: XR CHEST PA AND LATERAL-   INDICATION: Preoperative  COMPARISON: None  TECHNIQUE: 2 view chest  FINDINGS:  Linear opacity in the left midlung. No effusions. Normal mediastinal contour. Heart is normal in size.      Impression: Small amount of subsegmental atelectasis in the left upper lobe  This report was finalized on 7/4/2024 3:17 PM by Dr. Stephane Angelo M.D on Workstation: [id number]      XR Knee 1 or 2 View Left    Result Date: 7/4/2024  Narrative: XR KNEE 1 OR 2 VW LEFT-   INDICATION: Preoperative  COMPARISON: None  TECHNIQUE: 2 view left knee  FINDINGS:  No fracture. No bone lesion. No dislocation. Osteoarthritis, severe in the medial compartment with joint space loss and osteophytosis. Patellofemoral joint osteophytosis. Preserved lateral compartment. No effusion.      Impression: Osteoarthritis, severe in the medial compartment  This report was finalized on 7/4/2024 1:23 PM by Dr. Stephane Angelo M.D on Workstation: [id number]         Assessment:  End-stage Primary Left Knee Osteoarthritis    Plan:  Patient's pain is becoming disabling, despite extensive conservative treatment.  Radiographs reveal end-stage degenerative changes.  The risks of surgery, including, but not limited to, heart attack, stroke, dying, DVT, nerve injury, vascular injury, arthrofibrosis and infection were discussed.  The alternatives and benefits were also discussed.  All questions answered and the patient wishes to proceed with left total knee arthroplasty.    Robert ROLONC  [facility]  [address] [place] [address]  [phone]    7/16/2024    CC: Orion Live MD, Robert Glover MD

## 2024-07-16 NOTE — ANESTHESIA PREPROCEDURE EVALUATION
Anesthesia Evaluation     Patient summary reviewed and Nursing notes reviewed   no history of anesthetic complications:   NPO Solid Status: > 8 hours  NPO Liquid Status: > 2 hours           Airway   Mallampati: II  TM distance: >3 FB  Neck ROM: full  Dental      Pulmonary    Cardiovascular     ECG reviewed    (+) hypertension      Neuro/Psych  (+) psychiatric history  GI/Hepatic/Renal/Endo    (+) morbid obesity, GERD, renal disease- CRI, thyroid problem     Musculoskeletal     Abdominal    Substance History      OB/GYN          Other   arthritis,                       Anesthesia Plan    ASA 3     general     intravenous induction     Anesthetic plan, risks, benefits, and alternatives have been provided, discussed and informed consent has been obtained with: patient.        CODE STATUS:

## 2024-07-16 NOTE — DISCHARGE SUMMARY
Orthopaedic Surgery Discharge Summary    Patient Name:  Gela Holden  YOB: 1955  Age: 68 y.o.  Medical Records Number:  [id number]    Date of Admission:  7/16/2024  Date of Discharge:  7/17/2024    Primary Discharge Diagnosis:  Primary osteoarthritis of left knee [M17.12]    Secondary Discharge Diagnosis:    Problems Addressed this Visit          Musculoskeletal and Injuries    * (Principal) Primary osteoarthritis of left knee - Primary    Relevant Medications    oxyCODONE-acetaminophen (PERCOCET) 5-325 MG per tablet     Diagnoses         Codes Comments    Primary osteoarthritis of left knee    -  Primary ICD-10-CM: M17.12  ICD-9-CM: 715.16             Procedures Performed:  Left Total Knee Arthroplasty      Hospital Course:    Gela Holden is a 68 y.o.  female who underwent successful left tka on 7/16/2024.  Gela Holden was started on Aspirin 325 mg po twice daily post-operatively for DVT prophylaxis.  On post-op day 1 the patients dressing was changed and their incision was clean, with no signs of infection and their calf was soft, with no signs of DVT.  The patient progressed well with physical therapy and the patients hemoglobin remained stable. On post-operative day 1 the patient was felt ready for discharge.     Vitals:  Vitals:    07/16/24 1545 07/16/24 1600 07/16/24 1615 07/16/24 1630   BP: 133/68 139/49 140/79 144/74   BP Location:       Patient Position:       Pulse: 83 69 74 64   Resp: 15 15 15 15   Temp:       TempSrc:       SpO2: 99% 96% 100% 97%   Weight:       Height:           Discharge Medications:      Discharge Medications        New Medications        Instructions Start Date   aspirin 325 MG tablet  Commonly known as: Matheus Aspirin   325 mg, Oral, 2 Times Daily With Meals      docusate sodium 250 MG capsule  Commonly known as: COLACE   250 mg, Oral, 2 Times Daily PRN      oxyCODONE-acetaminophen 5-325 MG per tablet  Commonly known as: PERCOCET   1-2 tablets,  Oral, Every 4 Hours PRN             Continue These Medications        Instructions Start Date   budesonide-formoterol 80-4.5 MCG/ACT inhaler  Commonly known as: SYMBICORT   2 puffs, Inhalation, 2 Times Daily - RT      Cetirizine HCl 10 MG capsule   10 mg, Oral, As Needed      escitalopram 20 MG tablet  Commonly known as: LEXAPRO   20 mg, Oral, Daily      famotidine 40 MG tablet  Commonly known as: PEPCID   40 mg, Oral, As Needed      letrozole 2.5 MG tablet  Commonly known as: FEMARA   1 tablet, Oral, Daily      levothyroxine 75 MCG tablet  Commonly known as: SYNTHROID, LEVOTHROID   75 mcg, Oral, Daily      nebivolol 10 MG tablet  Commonly known as: BYSTOLIC   10 mg, Oral, Daily      NIFEdipine XL 60 MG 24 hr tablet  Commonly known as: PROCARDIA XL   60 mg, Oral, Daily      torsemide 20 MG tablet  Commonly known as: DEMADEX   60 mg, Oral, Daily, TAKES 3  20 MG TABLETS      vitamin D 1.25 MG (95390 UT) capsule capsule  Commonly known as: ERGOCALCIFEROL   50,000 Units, Oral, Every 7 Days, TAKES ON SUNDAY             Stop These Medications      meloxicam 15 MG tablet  Commonly known as: MOBIC              Pain Medications:  Percocet 5/325 mg 1-2 po q 4-6 hours prn pain    DVT Prophylaxis:  Enteric Coated Aspirin 325 mg po twice daily for 2 weeks, then one daily for 4 weeks      Total Knee Joint Replacement Discharge Instructions:    I. ACTIVITIES:  1. Exercises:  Complete exercise program as taught by the hospital physical therapist 2 times per day  Exercise program will be advanced by the physical therapist  During the day be up ambulating every 2 hours (while awake) for short distances  Complete the ankle pump exercises at least 10 times per hour (while awake)  Elevate legs most of the day the first week post operatively and thereafter elevate legs when in bed and for at least 30 minutes during the day. Caution must be taken to avoid pillow placement under the bend of the knee as this can led to flexion contractures  of the knee.  Use cold packs 20-30 minutes approximately 5 times per day. This should be done before and after completing your exercises and at any time you are experiencing pain/ stiffness in your operative extremity.      2. Activities of Daily Living:  No tub baths, hot tubs, or swimming pools for 4 weeks  May shower and let water run over the incision on post-operative day #5 if no drainage. Do not scrub or rub the incision. Simply let the water run over the incision and pat dry.    II. Restrictions  Do not cross legs or kneel  Your surgeon will discuss with you when you will be able to drive again.  Weight bearing is as tolerated  First week stay inside on even terrain. May go up and down stairs one stair at a time utilizing the hand rail  After one week, you may venture outside.    III. Precautions:  Everyone that comes near you should wash their hands  No elective dental, genital-urinary, or colon procedures or surgical procedures for 12 weeks after surgery unless absolutely necessary.   If dental work or surgical procedure is deemed absolutely necessary, you will need to contact your surgeon as you will need to take antibiotics 1 hour prior to any dental work (including teeth cleanings).  Please discuss with your surgeon prophylactic antibiotics as the length of time this intervention will be necessary for you varies with each patient’s health history and situation.  Avoid sick people. If you must be around someone who is ill, they should wear a mask.  Avoid visits to the Emergency Room or Urgent Care unless you are having a life threatening event.   If ordered stockings are to be placed on in the morning and removed at night. Monitor the stockings to ensure that any swelling is not causing the stockings to become too tight. In this case, remove stockings immediately.    IV. INCISION CARE:  Wash your hands prior to dressing changes  Change the dressing as needed to keep incision clean and dry. Utilize dry  gauze and paper tape. Avoid touching the side of the gauze that goes against the incision with your hands.  No creams or ointments to the incision  May remove dressing once the incision is free of drainage  Do not touch or pick at the incision  Check incision every day and notify surgeon immediately if any of the following signs or symptoms are noted:  Increase in redness  Increase in swelling around the incision and of the entire extremity  Increase in pain  Drainage oozing from the incision  Pulling apart of the edges of the incision  Increase in overall body temperature (greater than 100.5 degrees)  Your surgeon will instruct you regarding suture or staple removal    V. Medications:   1. Anticoagulants: You will be discharged on an anticoagulant. This is a prophylactic medication that helps prevent blood clots during your post-operative period. The type and length of dosage varies based on your individual needs, procedure performed, and surgeon’s preference.  While taking the anticoagulant, you should avoid taking any additional aspirin, ibuprofen (Advil or Motrin), Aleve (Naprosyn) or other non-steroidal anti-inflammatory medications.   Notify surgeon immediately if any davion bleeding is noted in the urine, stool, emesis, or from the nose or the incision. Blood in the stool will often appear as black rather than red. Blood in urine may appear as pink. Blood in emesis may appear as brown/black like coffee grounds.  You will need to apply pressure for longer periods of time to any cuts or abrasions to stop bleeding  Avoid alcohol while taking anticoagulants    2. Stool Softeners: You will be at greater risk of constipation after surgery due to being less mobile and the pain medications.   Take stool softeners as instructed by your surgeon while on pain medications. Over the counter Colace 100 mg 1-2 capsules twice daily.   If stools become too loose or too frequent, please decreases the dosage or stop the stool  softener.  If constipation occurs despite use of stool softeners, you are to continue the stool softeners and add a laxative (Milk of Magnesia 1 ounce daily as needed)  Drink plenty of fluids, and eat fruits and vegetables during your recovery time    3. Pain Medications utilized after surgery are narcotics and the law requires that the following information be given to all patients that are prescribed narcotics:  CLASSIFICATION: Pain medications are called Opioids and are narcotics  LEGALITIES: It is illegal to share narcotics with others and to drive within 24 hours of taking narcotics  POTENTIAL SIDE EFFECTS: Potential side effects of opioids include: nausea, vomiting, itching, dizziness, drowsiness, dry mouth, constipation, and difficulty urinating.  POTENTIAL ADVERSE EFFECTS:   Opioid tolerance can develop with use of pain medications and this simply means that it requires more and more of the medication to control pain; however, this is seen more in patients that use opioids for longer periods of time.  Opioid dependence can develop with use of Opioids and this simply means that to stop the medication can cause withdrawal symptoms; however, this is seen with patients that use Opioids for longer periods of time.  Opioid addiction can develop with use of Opioids and the incidence of this is very unlikely in patients who take the medications as ordered and stop the medications as instructed.  Opioid overdose can be dangerous, but is unlikely when the medication is taken as ordered and stopped when ordered. It is important not to mix opioids with alcohol or with and type of sedative such as Benadryl as this can lead to over sedation and respiratory difficulty.  DOSAGE:   Pain medications will need to be taken consistently for the first week to decrease pain and promote adequate pain relief and participation in physical therapy.  After the initial surgical pain begins to resolve, you may begin to decrease the pain  medication. By the end of 6-8 weeks, you should be off of pain medications.  Refills will not be given by the office during evening hours, on weekends, or after 6-8 weeks post-op.  To seek refills on pain medications during the initial 6 week post-operative period, you must call the office 48 hours in advance to request the refill. The office will then notify you when to  the prescription. DO NOT wait until you are out of the medication to request a refill.    V. FOLLOW-UP VISITS:  You will need to follow up in the office with your surgeon in 3 weeks. Please call this number [phone] to schedule this appointment.  If you have any concerns or suspected complications prior to your follow up visit, please call your surgeons office. Do not wait until your appointment time if you suspect complications. These will need to be addressed in the office promptly.    Robert Mcelroy PA-C  [facility]  [address] [place] [address]  [phone]    7/16/2024    CC:Orion Live MD:Robert Glover MD

## 2024-07-16 NOTE — OP NOTE
Orthopaedic Surgery Operative Note    Patient Name:  Gela Holden  YOB: 1955  Age: 68 y.o.  Medical Records Number:  [id number]    Date of Procedure:  7/16/2024    Pre-operative Diagnosis:  Primary Osteoarthritis Left Knee    Post-operative Diagnosis:  Primary Osteoarthritis Left Knee    Procedure Performed:  Left Total Knee Arthroplasty    Surgical Approach: Knee Mid-Vastus    Implants:  Biomet Vanguard TKA, 65 Femoral Component, 71Tibial Tray with a 40 mm Modular Stem, 31 3-Peg Patella, 16 mm Standard Polyethylene Insert    Surgeon:  Robert Glover M.D.    Assistant:  (who was present during the critical portions of the case, thereby decreasing operative time and patient morbidity)    Anesthetic Type:  General    Estimated Blood Loss:  250cc's    Specimens: * No orders in the log *    No Complications      Indications for Procedure:  Gela Holden is a 68 y.o. female suffering from end stage degenerative changes in the left knee.  The patients pain is becoming disabling, despite extensive conservative care, including NSAIDS, therapy and injections. The risks, benefits and alternatives were discussed and the patient wishes to proceed with left total knee arthroplasty.      Procedure Performed:    After informed consent was obtained, the correct patient identified, the correct operative side marked by the operative surgeon, and pre-operative IV  given (prior to tourniquet inflation), the patient was taken to the operating room and placed supine on the operating table.  After general anesthesia was induced, a surgical time out was performed and 1 gm of Tranxemic Acid given, a well-padded tourniquet was placed and the patient's left lower extremity was prepped with chloraprep and draped in a sterile fashion.    A midline incision was made overlying the left knee and we sharply dissected down to expose the parapatellar retinaculum.  A muscle sparing, mid-vastus, parapatellar arthrotomy was  performed and we elevated the soft tissue both medially and laterally.  The menisci and ACL were excised.  We everted the patella and measured this to be 22 mm and we removed 7 mm of bone down to 15 mm.  We measured the patella to be 31 and drilled our three drill holes.  We protected the patella with the patella protector and turned our attention to the femur and the tibia.    We drilled drill holes into the femoral and the tibial intramedullary canals and proceeded to irrigate the canals to remove the fatty marrow.  We used the intramedullary marisela and the 5 degree valgus cut block to make our distal femoral cut.  Once we had a smooth surface, we measured the femur to be 65.  We assured we had rotational alignment using the epicondylar axis, then we used the four-in-one cut block to make our anterior, posterior, anterior and posterior chamfer cuts.  We placed our femoral trial, had excellent fit, extended the knee and marked Meigs's line on the tibia.      We then turned our attention to the tibia, where we irrigated the canal again.  We used the intramedullary marisela and the 3 degree posterior sloped cut block to remove 2 mm of bone from the effected side of the tibia.  Prior to making our cut, we assured we had rotational alignment using the external guide and Candy's line.  Once we had a smooth surface, we measured the tibia to be 71.  We then removed soft tissue and bony debris from the posterior aspect of the knee.    We placed our trial implants and had excellent fit, range of motion, stability and ligament balance, throughout a complete arc of motion with a 16 std polyethylene liner.  We removed our trial implants, punched the tibial keel, copiously irrigated the knee, then cemented our implants in place using Biomet cement.  Once the cement cured, we trialed again with the 14, 16 and 18 AS, standard and posterior lipped polyethylene liners.  The 16 std gave us the best range of motion, stability and  "ligament balance, throughout a complete arc of motion.  We removed the trials, copiously irrigated the knee with dilute betadine solution, gave IV antibiotics and local anesthetic, then placed our permanent polyethylene liner.  We placed a 1/8\" hemovac drain, then closed the arthrotomy with #1 Vicryl pop-off sutures.  The subcutaneous tissue was closed with 2-0 vicryl pop-off sutures.  The skin was closed with staples.  We placed a sterile dressing of Xeroform, 4x4's, abdominal pads, cast padding and an Ace Wrap.  All sponge and needle counts were correct.  The patient was then awakened from general anesthesia and taken to the recovery room in stable condition.    The patient will be started on anticoagulation for DVT prophylaxis.  IV antibiotics will be discontinued within 24 hours of surgery.  Immediately prior to surgery, there were no acute Thromboembolic nor Cardiovascular risk factors.  An updated Medical Reconciliation form is on the chart.    Robert Mcelroy PA-C  [facility]  [address] [place] [address]  [phone]    7/16/2024      "

## 2024-07-16 NOTE — ANESTHESIA POSTPROCEDURE EVALUATION
"Patient: Gela Holden    Procedure Summary       Date: 07/16/24 Room / Location:  [facility] [address] [place] [address] [facility]    Anesthesia Start: 1332 Anesthesia Stop: 1539    Procedure: TOTAL KNEE ARTHROPLASTY (Left: Knee) Diagnosis:     Surgeons: Robert Glover MD Provider: Pito Gomez MD    Anesthesia Type: general ASA Status: 3            Anesthesia Type: general    Vitals  Vitals Value Taken Time   /68 07/16/24 1545   Temp     Pulse 83 07/16/24 1550   Resp     SpO2 100 % 07/16/24 1550   Vitals shown include unfiled device data.        Post Anesthesia Care and Evaluation    Patient location during evaluation: bedside  Pain management: adequate    Airway patency: patent  Anesthetic complications: No anesthetic complications    Cardiovascular status: acceptable  Respiratory status: acceptable  Hydration status: acceptable    Comments: */78 (BP Location: Left arm)   Pulse 55   Temp 36.4 °C (97.5 °F) (Oral)   Resp 18   Ht 170.2 cm (67.01\")   Wt 132 kg (291 lb 0.1 oz)   SpO2 98%   BMI 45.57 kg/m²       "

## 2024-07-16 NOTE — ANESTHESIA PROCEDURE NOTES
Airway  Urgency: elective    Date/Time: 7/16/2024 1:44 PM  Airway not difficult    General Information and Staff    Patient location during procedure: OR  CRNA/CAA: Valarie Coburn CRNA    Indications and Patient Condition  Indications for airway management: airway protection    Preoxygenated: yes  Mask difficulty assessment: 2 - vent by mask + OA or adjuvant +/- NMBA    Final Airway Details  Final airway type: endotracheal airway      Successful airway: ETT  Cuffed: yes   Successful intubation technique: direct laryngoscopy  Facilitating devices/methods: intubating stylet  Endotracheal tube insertion site: oral  Blade: Leisa  Blade size: 3  ETT size (mm): 7.0  Cormack-Lehane Classification: grade I - full view of glottis  Placement verified by: chest auscultation and capnometry   Measured from: lips  ETT/EBT  to lips (cm): 22  Number of attempts at approach: 1  Assessment: lips, teeth, and gum same as pre-op and atraumatic intubation

## 2024-07-17 LAB
ANION GAP SERPL CALCULATED.3IONS-SCNC: 13.5 MMOL/L (ref 5–15)
BUN SERPL-MCNC: 18 MG/DL (ref 8–23)
BUN/CREAT SERPL: 11.7 (ref 7–25)
CALCIUM SPEC-SCNC: 8.7 MG/DL (ref 8.6–10.5)
CHLORIDE SERPL-SCNC: 99 MMOL/L (ref 98–107)
CO2 SERPL-SCNC: 21.5 MMOL/L (ref 22–29)
CREAT SERPL-MCNC: 1.54 MG/DL (ref 0.57–1)
DEPRECATED RDW RBC AUTO: 44.9 FL (ref 37–54)
EGFRCR SERPLBLD CKD-EPI 2021: 36.9 ML/MIN/1.73
ERYTHROCYTE [DISTWIDTH] IN BLOOD BY AUTOMATED COUNT: 14.5 % (ref 12.3–15.4)
GLUCOSE SERPL-MCNC: 135 MG/DL (ref 65–99)
HCT VFR BLD AUTO: 33.2 % (ref 34–46.6)
HGB BLD-MCNC: 10.8 G/DL (ref 12–15.9)
MCH RBC QN AUTO: 27.1 PG (ref 26.6–33)
MCHC RBC AUTO-ENTMCNC: 32.5 G/DL (ref 31.5–35.7)
MCV RBC AUTO: 83.2 FL (ref 79–97)
PLATELET # BLD AUTO: 215 10*3/MM3 (ref 140–450)
PMV BLD AUTO: 10.5 FL (ref 6–12)
POTASSIUM SERPL-SCNC: 3.9 MMOL/L (ref 3.5–5.2)
RBC # BLD AUTO: 3.99 10*6/MM3 (ref 3.77–5.28)
SODIUM SERPL-SCNC: 134 MMOL/L (ref 136–145)
WBC NRBC COR # BLD AUTO: 8.82 10*3/MM3 (ref 3.4–10.8)

## 2024-07-17 PROCEDURE — 63710000001 NIFEDIPINE XL 60 MG TABLET SUSTAINED-RELEASE 24 HOUR: Performed by: ORTHOPAEDIC SURGERY

## 2024-07-17 PROCEDURE — A9270 NON-COVERED ITEM OR SERVICE: HCPCS | Performed by: ORTHOPAEDIC SURGERY

## 2024-07-17 PROCEDURE — 63710000001 OXYCODONE-ACETAMINOPHEN 5-325 MG TABLET: Performed by: ORTHOPAEDIC SURGERY

## 2024-07-17 PROCEDURE — 63710000001 ASPIRIN 325 MG TABLET DELAYED-RELEASE: Performed by: ORTHOPAEDIC SURGERY

## 2024-07-17 PROCEDURE — 63710000001 BUDESONIDE-FORMOTEROL 160-4.5 MCG/ACT AEROSOL 6 G INHALER: Performed by: ORTHOPAEDIC SURGERY

## 2024-07-17 PROCEDURE — 63710000001 LEVOTHYROXINE 75 MCG TABLET: Performed by: ORTHOPAEDIC SURGERY

## 2024-07-17 PROCEDURE — 80048 BASIC METABOLIC PNL TOTAL CA: CPT | Performed by: ORTHOPAEDIC SURGERY

## 2024-07-17 PROCEDURE — 25010000002 KETOROLAC TROMETHAMINE PER 15 MG: Performed by: ORTHOPAEDIC SURGERY

## 2024-07-17 PROCEDURE — 94799 UNLISTED PULMONARY SVC/PX: CPT

## 2024-07-17 PROCEDURE — 63710000001 ESCITALOPRAM 20 MG TABLET: Performed by: ORTHOPAEDIC SURGERY

## 2024-07-17 PROCEDURE — 63710000001 DIAZEPAM 5 MG TABLET: Performed by: ORTHOPAEDIC SURGERY

## 2024-07-17 PROCEDURE — 94664 DEMO&/EVAL PT USE INHALER: CPT

## 2024-07-17 PROCEDURE — G0378 HOSPITAL OBSERVATION PER HR: HCPCS

## 2024-07-17 PROCEDURE — 25010000002 CLINDAMYCIN 900 MG/50ML SOLUTION: Performed by: ORTHOPAEDIC SURGERY

## 2024-07-17 PROCEDURE — 63710000001 FERROUS SULFATE 325 (65 FE) MG TABLET: Performed by: ORTHOPAEDIC SURGERY

## 2024-07-17 PROCEDURE — 63710000001 NEBIVOLOL 10 MG TABLET: Performed by: ORTHOPAEDIC SURGERY

## 2024-07-17 PROCEDURE — 94640 AIRWAY INHALATION TREATMENT: CPT

## 2024-07-17 PROCEDURE — 63710000001 LETROZOLE 2.5 MG TABLET: Performed by: ORTHOPAEDIC SURGERY

## 2024-07-17 PROCEDURE — 85027 COMPLETE CBC AUTOMATED: CPT | Performed by: ORTHOPAEDIC SURGERY

## 2024-07-17 PROCEDURE — 97162 PT EVAL MOD COMPLEX 30 MIN: CPT

## 2024-07-17 PROCEDURE — 63710000001 TORSEMIDE 20 MG TABLET: Performed by: ORTHOPAEDIC SURGERY

## 2024-07-17 PROCEDURE — 97530 THERAPEUTIC ACTIVITIES: CPT

## 2024-07-17 RX ADMIN — NEBIVOLOL 10 MG: 10 TABLET ORAL at 08:12

## 2024-07-17 RX ADMIN — OXYCODONE HYDROCHLORIDE AND ACETAMINOPHEN 2 TABLET: 5; 325 TABLET ORAL at 12:44

## 2024-07-17 RX ADMIN — CLINDAMYCIN PHOSPHATE 900 MG: 900 INJECTION, SOLUTION INTRAVENOUS at 00:28

## 2024-07-17 RX ADMIN — OXYCODONE HYDROCHLORIDE AND ACETAMINOPHEN 2 TABLET: 5; 325 TABLET ORAL at 07:54

## 2024-07-17 RX ADMIN — Medication 10 ML: at 21:27

## 2024-07-17 RX ADMIN — LETROZOLE 2.5 MG: 2.5 TABLET ORAL at 08:11

## 2024-07-17 RX ADMIN — FERROUS SULFATE TAB 325 MG (65 MG ELEMENTAL FE) 325 MG: 325 (65 FE) TAB at 08:11

## 2024-07-17 RX ADMIN — ASPIRIN 325 MG: 325 TABLET, COATED ORAL at 17:02

## 2024-07-17 RX ADMIN — BUDESONIDE AND FORMOTEROL FUMARATE DIHYDRATE 2 PUFF: 160; 4.5 AEROSOL RESPIRATORY (INHALATION) at 10:10

## 2024-07-17 RX ADMIN — NIFEDIPINE 60 MG: 60 TABLET, FILM COATED, EXTENDED RELEASE ORAL at 08:12

## 2024-07-17 RX ADMIN — TORSEMIDE 60 MG: 20 TABLET ORAL at 08:12

## 2024-07-17 RX ADMIN — OXYCODONE HYDROCHLORIDE AND ACETAMINOPHEN 2 TABLET: 5; 325 TABLET ORAL at 21:26

## 2024-07-17 RX ADMIN — ESCITALOPRAM 20 MG: 20 TABLET, FILM COATED ORAL at 08:11

## 2024-07-17 RX ADMIN — ASPIRIN 325 MG: 325 TABLET, COATED ORAL at 08:10

## 2024-07-17 RX ADMIN — BUDESONIDE AND FORMOTEROL FUMARATE DIHYDRATE 2 PUFF: 160; 4.5 AEROSOL RESPIRATORY (INHALATION) at 20:41

## 2024-07-17 RX ADMIN — OXYCODONE HYDROCHLORIDE AND ACETAMINOPHEN 2 TABLET: 5; 325 TABLET ORAL at 17:03

## 2024-07-17 RX ADMIN — OXYCODONE HYDROCHLORIDE AND ACETAMINOPHEN 2 TABLET: 5; 325 TABLET ORAL at 03:45

## 2024-07-17 RX ADMIN — DIAZEPAM 5 MG: 5 TABLET ORAL at 03:45

## 2024-07-17 RX ADMIN — LEVOTHYROXINE SODIUM 75 MCG: 75 TABLET ORAL at 07:54

## 2024-07-17 RX ADMIN — Medication 10 ML: at 08:13

## 2024-07-17 RX ADMIN — KETOROLAC TROMETHAMINE 15 MG: 15 INJECTION, SOLUTION INTRAMUSCULAR; INTRAVENOUS at 03:45

## 2024-07-17 RX ADMIN — CLINDAMYCIN PHOSPHATE 900 MG: 900 INJECTION, SOLUTION INTRAVENOUS at 09:49

## 2024-07-17 NOTE — PROGRESS NOTES
Orthopaedic Surgery  Progress Note  7/17/2024    Patients Name:  Gela Holden  YOB: 1955  Age: 68 y.o.  Medical Records Number:  [id number]  Date of Admission: 7/16/2024    No complaints except appropriate pain and stiffness in the left knee    Vitals:  Vitals:    07/16/24 1708 07/16/24 2100 07/17/24 0210 07/17/24 0552   BP: 120/79 130/72 121/57 120/68   BP Location: Left arm Left arm Left arm Left arm   Patient Position: Lying Lying Lying Lying   Pulse: 70 60 61 62   Resp: 16 17 16 16   Temp: 98.8 °F (37.1 °C) 97.7 °F (36.5 °C) 97.7 °F (36.5 °C) 97.7 °F (36.5 °C)   TempSrc: Oral Oral Oral Oral   SpO2: 98% 100% 100% 99%   Weight:       Height:           LLE:  NVI, calf nontender, Sensation intact  No signs of DVT    Incision: clean, no signs of infection    Lab Results (last 24 hours)       Procedure Component Value Units Date/Time    Basic Metabolic Panel [[id number]]  (Abnormal) Collected: 07/17/24 0433    Specimen: Blood Updated: 07/17/24 0527     Glucose 135 mg/dL      BUN 18 mg/dL      Creatinine 1.54 mg/dL      Sodium 134 mmol/L      Potassium 3.9 mmol/L      Comment: Slight hemolysis detected by analyzer. Result may be falsely elevated.        Chloride 99 mmol/L      CO2 21.5 mmol/L      Calcium 8.7 mg/dL      BUN/Creatinine Ratio 11.7     Anion Gap 13.5 mmol/L      eGFR 36.9 mL/min/1.73     Narrative:      GFR Normal >60  Chronic Kidney Disease <60  Kidney Failure <15      CBC (No Diff) [[id number]]  (Abnormal) Collected: 07/17/24 0434    Specimen: Blood Updated: 07/17/24 0501     WBC 8.82 10*3/mm3      RBC 3.99 10*6/mm3      Hemoglobin 10.8 g/dL      Hematocrit 33.2 %      MCV 83.2 fL      MCH 27.1 pg      MCHC 32.5 g/dL      RDW 14.5 %      RDW-SD 44.9 fl      MPV 10.5 fL      Platelets 215 10*3/mm3             XR Knee 1 or 2 View Left    Result Date: 7/16/2024  Narrative: 2 RADIOGRAPHIC VIEWS OF THE LEFT KNEE  CLINICAL HISTORY: Postop left knee arthroplasty.  FINDINGS:  2 radiographic  views of the left knee demonstrate recent postoperative changes from a left knee arthroplasty. The arthroplasty prosthesis appears appropriately positioned. There is no significant periprosthetic lucency. No fracture is identified. Typical recent postoperative changes are noted within the adjacent soft tissues.  This report was finalized on 7/16/2024 4:19 PM by Dr. Gabriel Jeronimo M.D on Workstation: [id number]      XR Chest PA & Lateral    Result Date: 7/4/2024  Narrative: XR CHEST PA AND LATERAL-   INDICATION: Preoperative  COMPARISON: None  TECHNIQUE: 2 view chest  FINDINGS:  Linear opacity in the left midlung. No effusions. Normal mediastinal contour. Heart is normal in size.      Impression: Small amount of subsegmental atelectasis in the left upper lobe  This report was finalized on 7/4/2024 3:17 PM by Dr. Stephane Angelo M.D on Workstation: [id number]      XR Knee 1 or 2 View Left    Result Date: 7/4/2024  Narrative: XR KNEE 1 OR 2 VW LEFT-   INDICATION: Preoperative  COMPARISON: None  TECHNIQUE: 2 view left knee  FINDINGS:  No fracture. No bone lesion. No dislocation. Osteoarthritis, severe in the medial compartment with joint space loss and osteophytosis. Patellofemoral joint osteophytosis. Preserved lateral compartment. No effusion.      Impression: Osteoarthritis, severe in the medial compartment  This report was finalized on 7/4/2024 1:23 PM by Dr. Stephane Angelo M.D on Workstation: [id number]       Assesment/Plan:    Procedures:  Left TKA  Postoperative Day: 1  Weightbearing Status:  WBAT with walker  DVT Prophylaxis:  ASA for DVT prophylaxis    Disposition:  Home with home health after PT tomorrow, if comfortable and mobilizing safely    Robert Mcelroy PA-C  [facility]  [address] [place] [address]  [phone]    7/17/2024

## 2024-07-17 NOTE — CASE MANAGEMENT/SOCIAL WORK
Discharge Planning Assessment  [facility]     Patient Name: Gela Holden  MRN: [id number]  Today's Date: 7/17/2024    Admit Date: 7/16/2024    Plan: Home with family support & [facility].   Discharge Needs Assessment       Row Name 07/17/24 1002       Living Environment    People in Home alone    Current Living Arrangements home    Primary Care Provided by self    Provides Primary Care For no one    Family Caregiver if Needed child(sid), adult    Quality of Family Relationships helpful;involved;supportive    Able to Return to Prior Arrangements yes       Resource/Environmental Concerns    Transportation Concerns none       Transition Planning    Patient/Family Anticipates Transition to home with family;home with help/services    Patient/Family Anticipated Services at Transition     Transportation Anticipated family or friend will provide       Discharge Needs Assessment    Readmission Within the Last 30 Days no previous admission in last 30 days    Equipment Currently Used at Home none    Discharge Facility/Level of Care Needs home with home health    Provided Post Acute Provider List? N/A    N/A Provider List Comment Declined; requests [facility].    Patient's Choice of Community Agency(s) [facility].                   Discharge Plan       Row Name 07/17/24 1002       Plan    Plan Home with family support & [facility].    Patient/Family in Agreement with Plan yes    Plan Comments Spoke with the patient, verified current information and explained the role of the CCP. Patient said she lives alone and has family support. She's IADL and has no history with [facility]/HH. Patient plans to discharge home with family support and HH. Her daughter is staying with her to assist as needed while the patient recovers. Careplan received from [facility] which plans for [facility]. CCP discussed with the patient and she's agreeable with [facility]. Referral sent in [facility]. Patient plans for her family to  transport her home at discharge. No other needs identified. CCP will follow.                  Continued Care and Services - Admitted Since 7/16/2024       Home Medical Care       Service Provider Request Status Selected Services Address Phone Fax Patient Preferred    [facility] Pending - Request Sent N/A [address] [facility] [id number] [phone] --                  Expected Discharge Date and Time       Expected Discharge Date Expected Discharge Time    Jul 17, 2024            Demographic Summary       Row Name 07/17/24 1001       General Information    Admission Type observation    Reason for Consult discharge planning    Preferred Language English       Contact Information    Permission Granted to Share Info With ;family/designee                   Functional Status       Row Name 07/17/24 1001       Functional Status    Usual Activity Tolerance good       Functional Status, IADL    Medications independent    Meal Preparation independent    Housekeeping independent    Laundry independent    Shopping independent       Mental Status Summary    Recent Changes in Mental Status/Cognitive Functioning no changes                   Psychosocial       Row Name 07/17/24 1002       Intellectual Performance WDL    Level of Consciousness Alert       Coping/Stress    Patient Personal Strengths able to adapt    Sources of Support adult child(sid)    Reaction to Health Status accepting    Understanding of Condition and Treatment adequate understanding of medical condition;adequate understanding of treatment       Developmental Stage (Eriksson's)    Developmental Stage Stage 8 (65 years-death/Late Adulthood) Integrity vs. Despair                    Kirsten BARKER RN

## 2024-07-17 NOTE — THERAPY EVALUATION
Patient Name: Gela Holden  : 1955    MRN: [id number]                              Today's Date: 2024       Admit Date: 2024    Visit Dx:     ICD-10-CM ICD-9-CM   1. Primary osteoarthritis of left knee  M17.12 715.16     Patient Active Problem List   Diagnosis    Primary osteoarthritis of left knee     Past Medical History:   Diagnosis Date    Anesthesia     DOES NOT LIKE MASK ON FACE    Arthritis     At risk for sleep apnea     STOP BANG 5    Cancer 2021    BREAST RT    Cellulitis     Chronic back pain     Depression     Disease of thyroid gland     Exercise intolerance     GERD (gastroesophageal reflux disease)     Hypertension     Knee pain     RONY    Limited joint range of motion     LEFT    Stress incontinence     Varicose veins of left lower extremity     Vitamin D deficiency      Past Surgical History:   Procedure Laterality Date    BREAST BIOPSY Right 2021    BREAST LUMPECTOMY Right 2022    COLONOSCOPY  2019    TUBAL COAGULATION LAPAROSCOPIC Bilateral     VENTRAL HERNIA REPAIR  2015      General Information       Row Name 24 0958          Physical Therapy Time and Intention    Document Type evaluation  -MG     Mode of Treatment individual therapy;physical therapy  -MG       Row Name 24 0958          General Information    Patient Profile Reviewed yes  -MG     Prior Level of Function independent:  No AD. Owns RW.  -MG     Existing Precautions/Restrictions fall  -MG     Barriers to Rehab none identified  -MG       Row Name 24 0958          Living Environment    People in Home alone  -MG       Row Name 24 0958          Home Main Entrance    Number of Stairs, Main Entrance two  -MG     Stair Railings, Main Entrance railings safe and in good condition;railings on both sides of stairs  -MG       Row Name 24 0958          Stairs Within Home, Primary    Number of Stairs, Within Home, Primary none  -MG       Row Name 24 0958          Cognition     Orientation Status (Cognition) oriented x 4  -MG       Row Name 07/17/24 0958          Safety Issues, Functional Mobility    Safety Issues Affecting Function (Mobility) insight into deficits/self-awareness  -MG     Impairments Affecting Function (Mobility) balance;endurance/activity tolerance;pain;range of motion (ROM);strength  -MG     Comment, Safety Issues/Impairments (Mobility) Gait belt and non-skid socks donned.  -MG               User Key  (r) = Recorded By, (t) = Taken By, (c) = Cosigned By      Initials Name Provider Type    MG Jade Gregory, PT Physical Therapist                   Mobility       Row Name 07/17/24 1012          Bed Mobility    Bed Mobility supine-sit  -MG     Supine-Sit [place] (Bed Mobility) standby assist  -MG     Assistive Device (Bed Mobility) head of bed elevated  -MG     Comment, (Bed Mobility) Increased time to complete.  -MG       Row Name 07/17/24 1012          Sit-Stand Transfer    Sit-Stand [place] (Transfers) contact guard;verbal cues;minimum assist (75% patient effort)  -MG     Assistive Device (Sit-Stand Transfers) walker, front-wheeled  -MG     Comment, (Sit-Stand Transfer) x1 from slightly elevated EOB w/ CGA. x1 from commode w/ use of grab bar, RW, 3 attempts and Nirav. Cues for hand placement and sequencing.  -MG       Row Name 07/17/24 1012          Gait/Stairs (Locomotion)    [place] Level (Gait) contact guard;minimum assist (75% patient effort);verbal cues  -MG     Assistive Device (Gait) walker, front-wheeled  -MG     Distance in Feet (Gait) 25  -MG     Deviations/Abnormal Patterns (Gait) gait speed decreased;antalgic  -MG     Bilateral Gait Deviations forward flexed posture  -MG     Left Sided Gait Deviations weight shift ability decreased  -MG     Comment, (Gait/Stairs) Pt amb in room demoing step-to gait mechanics. No knee buckling or overt LOB. Cues for sequencing, posture and to not take too large of a step w/ LLE.  -MG       Row Name 07/17/24 1012           Mobility    Extremity Weight-bearing Status left lower extremity  -MG     Left Lower Extremity (Weight-bearing Status) weight-bearing as tolerated (WBAT)  -MG               User Key  (r) = Recorded By, (t) = Taken By, (c) = Cosigned By      Initials Name Provider Type    MG Jade Gregory PT Physical Therapist                   Obj/Interventions       Row Name 07/17/24 1014          Range of Motion Comprehensive    General Range of Motion lower extremity range of motion deficits identified  -MG     Comment, General Range of Motion RLE, LLE ankle/hip WFL. L knee 0-80  -MG       Row Name 07/17/24 1014          Strength Comprehensive (MMT)    General Manual Muscle Testing (MMT) Assessment lower extremity strength deficits identified  -MG     Comment, General Manual Muscle Testing (MMT) Assessment Generalized post-op weakness. Grossly 4/5.  -MG       Row Name 07/17/24 1014          Motor Skills    Therapeutic Exercise other (see comments)  TKA protocol x10. Edu to perform x10 later this date x2. Good recall of HEP at end of session.  -MG       Row Name 07/17/24 1014          Balance    Balance Assessment sitting static balance;sitting dynamic balance;standing static balance;standing dynamic balance  -MG     Static Sitting Balance modified independence  -MG     Dynamic Sitting Balance standby assist  -MG     Position, Sitting Balance sitting edge of bed  on commode  -MG     Static Standing Balance contact guard;standby assist  -MG     Dynamic Standing Balance contact guard;minimal assist;verbal cues  -MG     Position/Device Used, Standing Balance supported;walker, front-wheeled  -MG     Comment, Balance No knee buckling or overt LOB.  -MG       Row Name 07/17/24 1014          Sensory Assessment (Somatosensory)    Sensory Assessment (Somatosensory) sensation intact  -MG               User Key  (r) = Recorded By, (t) = Taken By, (c) = Cosigned By      Initials Name Provider Type    MG Jade Gregory, DARREN Physical  Therapist                   Goals/Plan       [facility] Name 07/17/24 1020          Bed Mobility Goal 1 (PT)    Activity/Assistive Device (Bed Mobility Goal 1, PT) sit to supine/supine to sit  -MG     [place] Level/Cues Needed (Bed Mobility Goal 1, PT) modified independence  -MG     Time Frame (Bed Mobility Goal 1, PT) long term goal (LTG);3 days  -MG       [facility] Name 07/17/24 1020          Transfer Goal 1 (PT)    Activity/Assistive Device (Transfer Goal 1, PT) sit-to-stand/stand-to-sit  -MG     [place] Level/Cues Needed (Transfer Goal 1, PT) standby assist  -MG     Time Frame (Transfer Goal 1, PT) long term goal (LTG);3 days  -MG       Row Name 07/17/24 1020          Gait Training Goal 1 (PT)    Activity/Assistive Device (Gait Training Goal 1, PT) gait (walking locomotion)  -MG     [place] Level (Gait Training Goal 1, PT) standby assist  -MG     Distance (Gait Training Goal 1, PT) 120  -MG     Time Frame (Gait Training Goal 1, PT) long term goal (LTG);3 days  -MG       Row Name 07/17/24 1020          ROM Goal 1 (PT)    ROM Goal 1 (PT) Surgical Knee ROM: 3-85  -MG     Time Frame (ROM Goal 1, PT) long-term goal (LTG);3 days  -MG       Row Name 07/17/24 1020          Stairs Goal 1 (PT)    Activity/Assistive Device (Stairs Goal 1, PT) stairs, all skills  -MG     [place] Level/Cues Needed (Stairs Goal 1, PT) contact guard required;minimum assist (75% or more patient effort)  -MG     Number of Stairs (Stairs Goal 1, PT) 2  -MG     Time Frame (Stairs Goal 1, PT) 3 days  -MG       Row Name 07/17/24 1020          Therapy Assessment/Plan (PT)    Planned Therapy Interventions (PT) balance training;bed mobility training;gait training;home exercise program;ROM (range of motion);patient/family education;stair training;strengthening;stretching;transfer training  -MG               User Key  (r) = Recorded By, (t) = Taken By, (c) = Cosigned By      Initials Name Provider Type    MG Jade Gregory, PT Physical Therapist                    Clinical Impression       Row Name 07/17/24 1015          Pain    Pretreatment Pain Rating 3/10  -MG     Posttreatment Pain Rating 7/10  -MG     Pain Location - Side/Orientation Left  -MG     Pain Location incisional  -MG     Pain Location - knee  -MG     Pain Intervention(s) Medication (See MAR);Ambulation/increased activity;Repositioned;Cold pack;Rest  -MG       Row Name 07/17/24 1015          Plan of Care Review    Plan of Care Reviewed With patient  -MG     Progress improving  -MG     Outcome Evaluation Pt is a 67 y/o F POD1 L TKA. Pt reports being independent at baseline, no AD, owns a RW, and lives alone in a SS house w/ 3 PATRICK and BHR. Pt states her dtr will be staying with her 24/7 for the first few days home and has other family/friends who can stay with her if needed. Today, pt was SBA for bed mobility w/ increased time, CG/Nirav for STS x2 from EOB and commode w/ RW, (I) w/ pericare, and CG/Nirav for ambulation of 25' w/ RW demoing step-to, antalgic gait mechanics. No knee buckling, SOB, dizziness or overt LOB w/ mobility. Pt performed TKA protocol ther-ex for 10 reps w/ good recall at end of session in order to perform 2 additional times this date. Edu on icing, positioning, RW safey, HEP and activity recs; pt v/u. Pt presents with typical post-op generalized weakness, impaired ROM and dec endurance, thus will benefit from skilled PT services to address these deficits. SBAR w/ RN following session. Rec home w/ HH PT and assist at NH.  -MG       Row Name 07/17/24 1015          Therapy Assessment/Plan (PT)    Rehab Potential (PT) good, to achieve stated therapy goals  -MG     Criteria for Skilled Interventions Met (PT) yes;meets criteria  -MG     Therapy Frequency (PT) daily  -MG       Row Name 07/17/24 1015          Vital Signs    Pretreatment Heart Rate (beats/min) 55  -MG     Pre SpO2 (%) 98  2  -MG     O2 Delivery Pre Treatment nasal cannula  -MG     Intra SpO2 (%) 95  -MG     O2  Delivery Intra Treatment room air  -MG     Post SpO2 (%) 96  -MG     O2 Delivery Post Treatment room air  -MG     Pre Patient Position Supine  -MG     Intra Patient Position Standing  -MG     Post Patient Position Sitting  -MG       Row Name 07/17/24 1015          Positioning and Restraints    Pre-Treatment Position in bed  -MG     Post Treatment Position chair  -MG     In Chair notified nsg;reclined;call light within reach;encouraged to call for assist;exit alarm on;with nsg;legs elevated;LLE elevated  -MG               User Key  (r) = Recorded By, (t) = Taken By, (c) = Cosigned By      Initials Name Provider Type    Jade Patel, DARREN Physical Therapist                   Outcome Measures       Row Name 07/17/24 1021          How much help from another person do you currently need...    Turning from your back to your side while in flat bed without using bedrails? 3  -MG     Moving from lying on back to sitting on the side of a flat bed without bedrails? 3  -MG     Moving to and from a bed to a chair (including a wheelchair)? 3  -MG     Standing up from a chair using your arms (e.g., wheelchair, bedside chair)? 3  -MG     Climbing 3-5 steps with a railing? 3  -MG     To walk in hospital room? 3  -MG     AM-PAC 6 Clicks Score (PT) 18  -MG     Highest Level of Mobility Goal 6 --> Walk 10 steps or more  -MG               User Key  (r) = Recorded By, (t) = Taken By, (c) = Cosigned By      Initials Name Provider Type    Jade Patel, DARREN Physical Therapist                                 Physical Therapy Education       Title: PT OT SLP Therapies (Done)       Topic: Physical Therapy (Done)       Point: Mobility training (Done)       Learning Progress Summary             Patient Acceptance, E,TB,D, VU,DU by  at 7/17/2024 1021                         Point: Home exercise program (Done)       Learning Progress Summary             Patient Acceptance, E,TB,D, VU,DU by  at 7/17/2024 1021                          Point: Body mechanics (Done)       Learning Progress Summary             Patient Acceptance, E,TB,D, VU,DU by  at 7/17/2024 1021                         Point: Precautions (Done)       Learning Progress Summary             Patient Acceptance, E,TB,D, VU,DU by  at 7/17/2024 1021                                         User Key       Initials Effective Dates Name Provider Type Discipline     05/24/22 -  Jade Gregory, PT Physical Therapist PT                  PT Recommendation and Plan  Planned Therapy Interventions (PT): balance training, bed mobility training, gait training, home exercise program, ROM (range of motion), patient/family education, stair training, strengthening, stretching, transfer training  Plan of Care Reviewed With: patient  Progress: improving  Outcome Evaluation: Pt is a 67 y/o F POD1 L TKA. Pt reports being independent at baseline, no AD, owns a RW, and lives alone in a SS house w/ 3 PATRICK and BHR. Pt states her dtr will be staying with her 24/7 for the first few days home and has other family/friends who can stay with her if needed. Today, pt was SBA for bed mobility w/ increased time, CG/Nirav for STS x2 from EOB and commode w/ RW, (I) w/ pericare, and CG/Nirav for ambulation of 25' w/ RW demoing step-to, antalgic gait mechanics. No knee buckling, SOB, dizziness or overt LOB w/ mobility. Pt performed TKA protocol ther-ex for 10 reps w/ good recall at end of session in order to perform 2 additional times this date. Edu on icing, positioning, RW safey, HEP and activity recs; pt v/u. Pt presents with typical post-op generalized weakness, impaired ROM and dec endurance, thus will benefit from skilled PT services to address these deficits. SBAR w/ RN following session. Rec home w/ HH PT and assist at [place].     Time Calculation:         PT Charges       Row Name 07/17/24 1022             Time Calculation    Start Time 0934  -MG      Stop Time 1007  -MG      Time Calculation (min) 33 min  -MG       PT Received On 07/17/24  -MG      PT - Next Appointment 07/18/24  -MG      PT Goal Re-Cert Due Date 07/31/24  -MG         Time Calculation- PT    Total Timed Code Minutes- PT 20 minute(s)  -MG                User Key  (r) = Recorded By, (t) = Taken By, (c) = Cosigned By      Initials Name Provider Type    MG Jade Gregory, PT Physical Therapist                  Therapy Charges for Today       Code Description Service Date Service Provider Modifiers Qty    [id number]  PT EVAL MOD COMPLEXITY 2 7/17/2024 Jade Gregory, PT GP 1    [id number]  PT THERAPEUTIC ACT EA 15 MIN 7/17/2024 Jade Gregory, PT GP 1            PT G-Codes  AM-PAC 6 Clicks Score (PT): 18  PT Discharge Summary  Anticipated Discharge Disposition (PT): home with home health, home with assist    Jade Gregory, PT  7/17/2024

## 2024-07-17 NOTE — DISCHARGE PLACEMENT REQUEST
"Gary Chávez (68 y.o. Female)       Date of Birth   1955    Social Security Number       Address   [address] [place] [address]    Home Phone   [phone]    MRN   [id number]       Confucianism   Unknown    Marital Status   Single                            Admission Date   7/16/24    Admission Type   Elective    Admitting Provider   Robert Glover MD    Attending Provider   Robert Glover MD    Department, Room/Bed   [address], 95/1       Discharge Date       Discharge Disposition   Home or Self Care    Discharge Destination                                 Attending Provider: Robert Glover MD    Allergies: Penicillins, Sulfa Antibiotics    Isolation: None   Infection: None   Code Status: CPR    Ht: 170.2 cm (67.01\")   Wt: 132 kg (291 lb 0.1 oz)    Admission Cmt: None   Principal Problem: Primary osteoarthritis of left knee [M17.12]                   Active Insurance as of 7/16/2024       Primary Coverage       Payor Plan Insurance Group Employer/Plan Group    MEDICARE MEDICARE A & B        Payor Plan Address Payor Plan Phone Number Payor Plan Fax Number Effective Dates    [address] [phone]  8/1/2021 - None Entered    [facility] [id number]         Subscriber Name Subscriber Birth Date Member ID       GARY CHÁVEZ 1955 [id number]               Secondary Coverage       Payor Plan Insurance Group Employer/Plan Group    [facility] [id number]       Payor Plan Address Payor Plan Phone Number Payor Plan Fax Number Effective Dates    [address]   1/1/2024 - None Entered    [facility] [id number]         Subscriber Name Subscriber Birth Date Member ID       GARY CHÁVEZ 1955 [id number]                     Emergency Contacts        (Rel.) Home Phone Work Phone Mobile Phone    GARY MEDRANO (Daughter) -- -- [phone]              "

## 2024-07-17 NOTE — PLAN OF CARE
Goal Outcome Evaluation:         Patient arrived to floor able to walk from stretcher to bed with assist of 2. Patient very sleepy upon arrival but arousable to voice and will fall back asleep quickly. VSS and dressing clean dry and intact.

## 2024-07-17 NOTE — PLAN OF CARE
Goal Outcome Evaluation:            VSS.  Pt up with assistance and walker to BSC.  Tolerating PO intake.

## 2024-07-17 NOTE — PLAN OF CARE
Goal Outcome Evaluation:  Plan of Care Reviewed With: patient        Progress: improving  Outcome Evaluation: Pt is a 67 y/o F POD1 L TKA. Pt reports being independent at baseline, no AD, owns a RW, and lives alone in a SS house w/ 3 PATRICK and BHR. Pt states her dtr will be staying with her 24/7 for the first few days home and has other family/friends who can stay with her if needed. Today, pt was SBA for bed mobility w/ increased time, CG/Nirav for STS x2 from EOB and commode w/ RW, (I) w/ pericare, and CG/Nirav for ambulation of 25' w/ RW demoing step-to, antalgic gait mechanics. No knee buckling, SOB, dizziness or overt LOB w/ mobility. Pt performed TKA protocol ther-ex for 10 reps w/ good recall at end of session in order to perform 2 additional times this date. Edu on icing, positioning, RW safey, HEP and activity recs; pt v/u. Pt presents with typical post-op generalized weakness, impaired ROM and dec endurance, thus will benefit from skilled PT services to address these deficits. SBAR w/ RN following session. Rec home w/ HH PT and assist at [place].      Anticipated Discharge Disposition (PT): home with home health, home with assist

## 2024-07-18 VITALS
DIASTOLIC BLOOD PRESSURE: 56 MMHG | BODY MASS INDEX: 45.67 KG/M2 | HEART RATE: 68 BPM | TEMPERATURE: 98.8 F | OXYGEN SATURATION: 92 % | SYSTOLIC BLOOD PRESSURE: 103 MMHG | RESPIRATION RATE: 18 BRPM | WEIGHT: 291.01 LBS | HEIGHT: 67 IN

## 2024-07-18 PROCEDURE — 63710000001 NEBIVOLOL 10 MG TABLET: Performed by: ORTHOPAEDIC SURGERY

## 2024-07-18 PROCEDURE — A9270 NON-COVERED ITEM OR SERVICE: HCPCS | Performed by: ORTHOPAEDIC SURGERY

## 2024-07-18 PROCEDURE — 97116 GAIT TRAINING THERAPY: CPT

## 2024-07-18 PROCEDURE — 63710000001 LETROZOLE 2.5 MG TABLET: Performed by: ORTHOPAEDIC SURGERY

## 2024-07-18 PROCEDURE — 94664 DEMO&/EVAL PT USE INHALER: CPT

## 2024-07-18 PROCEDURE — 63710000001 FERROUS SULFATE 325 (65 FE) MG TABLET: Performed by: ORTHOPAEDIC SURGERY

## 2024-07-18 PROCEDURE — 63710000001 NIFEDIPINE XL 60 MG TABLET SUSTAINED-RELEASE 24 HOUR: Performed by: ORTHOPAEDIC SURGERY

## 2024-07-18 PROCEDURE — 97110 THERAPEUTIC EXERCISES: CPT

## 2024-07-18 PROCEDURE — 25010000002 ONDANSETRON PER 1 MG: Performed by: ORTHOPAEDIC SURGERY

## 2024-07-18 PROCEDURE — 63710000001 ASPIRIN 325 MG TABLET DELAYED-RELEASE: Performed by: ORTHOPAEDIC SURGERY

## 2024-07-18 PROCEDURE — 63710000001 TORSEMIDE 20 MG TABLET: Performed by: ORTHOPAEDIC SURGERY

## 2024-07-18 PROCEDURE — 63710000001 LEVOTHYROXINE 75 MCG TABLET: Performed by: ORTHOPAEDIC SURGERY

## 2024-07-18 PROCEDURE — 94799 UNLISTED PULMONARY SVC/PX: CPT

## 2024-07-18 PROCEDURE — G0378 HOSPITAL OBSERVATION PER HR: HCPCS

## 2024-07-18 PROCEDURE — 63710000001 CETIRIZINE 10 MG TABLET: Performed by: ORTHOPAEDIC SURGERY

## 2024-07-18 PROCEDURE — 63710000001 ESCITALOPRAM 20 MG TABLET: Performed by: ORTHOPAEDIC SURGERY

## 2024-07-18 PROCEDURE — 97530 THERAPEUTIC ACTIVITIES: CPT

## 2024-07-18 PROCEDURE — 63710000001 OXYCODONE-ACETAMINOPHEN 5-325 MG TABLET: Performed by: ORTHOPAEDIC SURGERY

## 2024-07-18 RX ADMIN — OXYCODONE HYDROCHLORIDE AND ACETAMINOPHEN 2 TABLET: 5; 325 TABLET ORAL at 05:07

## 2024-07-18 RX ADMIN — OXYCODONE HYDROCHLORIDE AND ACETAMINOPHEN 2 TABLET: 5; 325 TABLET ORAL at 14:53

## 2024-07-18 RX ADMIN — OXYCODONE HYDROCHLORIDE AND ACETAMINOPHEN 2 TABLET: 5; 325 TABLET ORAL at 00:44

## 2024-07-18 RX ADMIN — LEVOTHYROXINE SODIUM 75 MCG: 75 TABLET ORAL at 07:40

## 2024-07-18 RX ADMIN — ASPIRIN 325 MG: 325 TABLET, COATED ORAL at 08:28

## 2024-07-18 RX ADMIN — FERROUS SULFATE TAB 325 MG (65 MG ELEMENTAL FE) 325 MG: 325 (65 FE) TAB at 08:28

## 2024-07-18 RX ADMIN — NIFEDIPINE 60 MG: 60 TABLET, FILM COATED, EXTENDED RELEASE ORAL at 08:28

## 2024-07-18 RX ADMIN — OXYCODONE HYDROCHLORIDE AND ACETAMINOPHEN 2 TABLET: 5; 325 TABLET ORAL at 10:22

## 2024-07-18 RX ADMIN — Medication 10 ML: at 08:28

## 2024-07-18 RX ADMIN — BUDESONIDE AND FORMOTEROL FUMARATE DIHYDRATE 2 PUFF: 160; 4.5 AEROSOL RESPIRATORY (INHALATION) at 08:38

## 2024-07-18 RX ADMIN — NEBIVOLOL 10 MG: 10 TABLET ORAL at 08:28

## 2024-07-18 RX ADMIN — ESCITALOPRAM 20 MG: 20 TABLET, FILM COATED ORAL at 08:28

## 2024-07-18 RX ADMIN — ONDANSETRON 4 MG: 2 INJECTION INTRAMUSCULAR; INTRAVENOUS at 10:18

## 2024-07-18 RX ADMIN — CETIRIZINE HYDROCHLORIDE 10 MG: 10 TABLET ORAL at 08:28

## 2024-07-18 RX ADMIN — TORSEMIDE 60 MG: 20 TABLET ORAL at 08:28

## 2024-07-18 RX ADMIN — LETROZOLE 2.5 MG: 2.5 TABLET ORAL at 08:28

## 2024-07-18 NOTE — THERAPY TREATMENT NOTE
Patient Name: Gela Holden  : 1955    MRN: [id number]                              Today's Date: 2024       Admit Date: 2024    Visit Dx:     ICD-10-CM ICD-9-CM   1. Primary osteoarthritis of left knee  M17.12 715.16     Patient Active Problem List   Diagnosis    Primary osteoarthritis of left knee     Past Medical History:   Diagnosis Date    Anesthesia     DOES NOT LIKE MASK ON FACE    Arthritis     At risk for sleep apnea     STOP BANG 5    Cancer 2021    BREAST RT    Cellulitis     Chronic back pain     Depression     Disease of thyroid gland     Exercise intolerance     GERD (gastroesophageal reflux disease)     Hypertension     Knee pain     RONY    Limited joint range of motion     LEFT    Stress incontinence     Varicose veins of left lower extremity     Vitamin D deficiency      Past Surgical History:   Procedure Laterality Date    BREAST BIOPSY Right 2021    BREAST LUMPECTOMY Right 2022    COLONOSCOPY  2019    TOTAL KNEE ARTHROPLASTY Left 2024    Procedure: TOTAL KNEE ARTHROPLASTY;  Surgeon: Robert Glover MD;  Location: [facility] [place];  Service: Orthopedics;  Laterality: Left;    TUBAL COAGULATION LAPAROSCOPIC Bilateral     VENTRAL HERNIA REPAIR  2015      General Information       Row Name 24          Physical Therapy Time and Intention    Document Type therapy note (daily note)  -MG     Mode of Treatment physical therapy;individual therapy  -MG       Row Name 24          General Information    Patient Profile Reviewed yes  -MG     Existing Precautions/Restrictions fall  -MG     Barriers to Rehab none identified  -MG       Row Name 24          Cognition    Orientation Status (Cognition) oriented x 4  -MG       Row Name 24          Safety Issues, Functional Mobility    Safety Issues Affecting Function (Mobility) insight into deficits/self-awareness  -MG     Impairments Affecting Function (Mobility)  balance;endurance/activity tolerance;pain;range of motion (ROM);strength  -MG     Comment, Safety Issues/Impairments (Mobility) Gait belt and non-skid socks donned.  -MG               User Key  (r) = Recorded By, (t) = Taken By, (c) = Cosigned By      Initials Name Provider Type    MG Jade Gregory PT Physical Therapist                   Mobility       Row Name 07/18/24 1657          Bed Mobility    Bed Mobility sit-supine  -MG     Supine-Sit Curry (Bed Mobility) standby assist;verbal cues  -MG     Sit-Supine Curry (Bed Mobility) standby assist;verbal cues  -MG     Assistive Device (Bed Mobility) head of bed elevated  -MG     Comment, (Bed Mobility) Increased time to complete.  -MG       Row Name 07/18/24 1657          Sit-Stand Transfer    Sit-Stand Curry (Transfers) contact guard;standby assist;verbal cues  -MG     Assistive Device (Sit-Stand Transfers) walker, front-wheeled  -MG     Comment, (Sit-Stand Transfer) x1 EOB, x1 commode, x1 chair. Cues for hand placement. Increased time to complete. Difficulty and multiple attempts from low surface bed.  -MG       Row Name 07/18/24 1657          Gait/Stairs (Locomotion)    Curry Level (Gait) contact guard;standby assist;verbal cues  -MG     Assistive Device (Gait) walker, front-wheeled  -MG     Distance in Feet (Gait) 80  -MG     Deviations/Abnormal Patterns (Gait) gait speed decreased;antalgic;stride length decreased  -MG     Bilateral Gait Deviations forward flexed posture  -MG     Left Sided Gait Deviations weight shift ability decreased  -MG     Curry Level (Stairs) contact guard;stand by assist;verbal cues  -MG     Handrail Location (Stairs) both sides  -MG     Number of Steps (Stairs) 4  -MG     Ascending Technique (Stairs) step-to-step  -MG     Descending Technique (Stairs) step-to-step  -MG     Stairs, Safety Issues weight-shifting ability decreased  -MG     Stairs, Impairments ROM decreased;pain  -MG     Comment,  (Gait/Stairs) Initially amb in room w/ step-to mechanics w/ her RLE behind her LLE. Able to progress to partial step-through w/ RLE. Difficulty w/ L knee flexion and WB. Cues for sequencing, posture and PLB. Pt able to ascend/descend 4 steps w/ BHR and dtr present for family teaching. No knee buckling, SOB, dizziness or overt LOB during gait or stairs. Dtr following with recliner chair on amb back to room. Pt was nauseous following gait.  -MG       Row Name 07/18/24 1657          Mobility    Extremity Weight-bearing Status left lower extremity  -MG     Left Lower Extremity (Weight-bearing Status) weight-bearing as tolerated (WBAT)  -MG               User Key  (r) = Recorded By, (t) = Taken By, (c) = Cosigned By      Initials Name Provider Type    Jade Patel, PT Physical Therapist                   Obj/Interventions       Row Name 07/18/24 1701          Motor Skills    Therapeutic Exercise other (see comments)  TKA protocol x10. Pt able to recall 3/4 exercises. Edu to perform x10-15 reps TID until seen by HH.  -MG       Row Name 07/18/24 1701          Balance    Static Sitting Balance modified independence  -MG     Dynamic Sitting Balance standby assist  -MG     Position, Sitting Balance sitting edge of bed  commode  -MG     Static Standing Balance standby assist  -MG     Dynamic Standing Balance standby assist;verbal cues  -MG     Position/Device Used, Standing Balance supported;walker, front-wheeled  -MG     Comment, Balance Independent w/ pericare. SV for doffing/donning LE clothing. No overt LOB or knee buckling.  -MG               User Key  (r) = Recorded By, (t) = Taken By, (c) = Cosigned By      Initials Name Provider Type    Jade Patel, PT Physical Therapist                   Goals/Plan       Row Name 07/18/24 1707          Bed Mobility Goal 1 (PT)    Progress/Outcomes (Bed Mobility Goal 1, PT) good progress toward goal;goal ongoing  -MG       Row Name 07/18/24 1707          Transfer Goal 1 (PT)     Progress/Outcome (Transfer Goal 1, PT) goal met  -MG       Row Name 07/18/24 1707          Gait Training Goal 1 (PT)    Progress/Outcome (Gait Training Goal 1, PT) good progress toward goal;goal partially met  -MG       Row Name 07/18/24 1707          ROM Goal 1 (PT)    Progress/Outcome (ROM Goal 1, PT) good progress toward goal;goal partially met  Able to extend well. Difficulty and pain w/ flexion.  -MG       Row Name 07/18/24 1707          Stairs Goal 1 (PT)    Progress/Outcome (Stairs Goal 1, PT) goal met  -MG               User Key  (r) = Recorded By, (t) = Taken By, (c) = Cosigned By      Initials Name Provider Type    MG Jade Gregory, PT Physical Therapist                   Clinical Impression       Row Name 07/18/24 1702          Pain    Pretreatment Pain Rating 7/10  -MG     Posttreatment Pain Rating 8/10  -MG     Pain Location - Side/Orientation Left  -MG     Pain Location incisional  -MG     Pain Location - knee  -MG     Pain Intervention(s) Medication (See MAR);Ambulation/increased activity;Repositioned;Cold pack;Rest  -MG       Row Name 07/18/24 1702          Plan of Care Review    Plan of Care Reviewed With patient;daughter  -MG     Progress improving  -MG     Outcome Evaluation Pt seen for PT tx this PM and tolerated the session well. Pts dtr present during session for family teaching. Today, pt was SBA for bed mobility, CG/SBA for total of 3 STS to RW, SBA/SV for donning/doffing LE clothing, independent w/ pericare, CG/SBA for ambulation of 80' w/ RW and CG/SBA to ascend/descend 4 steps w/ BHR in a non-reciprocal step pattern. No knee buckling, SOB, dizziness or overt LOB during the session. Pt was able to progress her gait mechanics to partial step-through w/ RLE, but cont to have severe pain w/ LLE weight-bearing. Pt performed TKA protocol ther-ex for 10 reps w/ good recall of 3/4 exercises. Edu to pt/dtr on icing, positioning, HEP, stair/gait sequencing/safety, RW management, need/benefit for  family supervision/help at least the first day home, energy conservation and falls prevention; they v/u. Pt/dtr state no further concerns for questions for return home. SBAR w/ RN following session. Cont to rec home w/ HH PT and family assist.  -MG       Row Name 07/18/24 1702          Therapy Assessment/Plan (PT)    Rehab Potential (PT) good, to achieve stated therapy goals  -MG     Criteria for Skilled Interventions Met (PT) yes;meets criteria  -MG     Therapy Frequency (PT) daily  -MG       Row Name 07/18/24 1702          Vital Signs    O2 Delivery Pre Treatment room air  -MG     O2 Delivery Intra Treatment room air  -MG     O2 Delivery Post Treatment room air  -MG     Pre Patient Position Supine  -MG     Intra Patient Position Standing  -MG     Post Patient Position Supine  -MG       Row Name 07/18/24 1702          Positioning and Restraints    Pre-Treatment Position in bed  -MG     Post Treatment Position bed  -MG     In Bed notified nsg;supine;fowlers;encouraged to call for assist;call light within reach;exit alarm on;with family/caregiver;side rails up x2  -MG               User Key  (r) = Recorded By, (t) = Taken By, (c) = Cosigned By      Initials Name Provider Type    MG Jade Gregory, PT Physical Therapist                   Outcome Measures       Row Name 07/18/24 1707 07/18/24 0830       How much help from another person do you currently need...    Turning from your back to your side while in flat bed without using bedrails? 3  -MG 3  -KM    Moving from lying on back to sitting on the side of a flat bed without bedrails? 3  -MG 3  -KM    Moving to and from a bed to a chair (including a wheelchair)? 3  -MG 3  -KM    Standing up from a chair using your arms (e.g., wheelchair, bedside chair)? 3  -MG 3  -KM    Climbing 3-5 steps with a railing? 3  -MG 3  -KM    To walk in hospital room? 3  -MG 3  -KM    AM-PAC 6 Clicks Score (PT) 18  -MG 18  -KM    Highest Level of Mobility Goal 6 --> Walk 10 steps or more   -MG 6 --> Walk 10 steps or more  -KM              User Key  (r) = Recorded By, (t) = Taken By, (c) = Cosigned By      Initials Name Provider Type    MG Jade Gregory, DARREN Physical Therapist    Disha Styles, RN Registered Nurse                                 Physical Therapy Education       Title: PT OT SLP Therapies (Resolved)       Topic: Physical Therapy (Resolved)       Point: Mobility training (Resolved)       Learning Progress Summary             Patient Acceptance, E,TB,D, VU,DU,NR by  at 7/18/2024 1707    Acceptance, E,TB,D, VU,DU by MG at 7/17/2024 1021   Family Acceptance, E,TB,D, VU,DU,NR by MG at 7/18/2024 1707                         Point: Home exercise program (Resolved)       Learning Progress Summary             Patient Acceptance, E,TB,D, VU,DU,NR by  at 7/18/2024 1707    Acceptance, E,TB,D, VU,DU by MG at 7/17/2024 1021   Family Acceptance, E,TB,D, VU,DU,NR by  at 7/18/2024 1707                         Point: Body mechanics (Resolved)       Learning Progress Summary             Patient Acceptance, E,TB,D, VU,DU,NR by  at 7/18/2024 1707    Acceptance, E,TB,D, VU,DU by MG at 7/17/2024 1021   Family Acceptance, E,TB,D, VU,DU,NR by MG at 7/18/2024 1707                         Point: Precautions (Resolved)       Learning Progress Summary             Patient Acceptance, E,TB,D, VU,DU,NR by  at 7/18/2024 1707    Acceptance, E,TB,D, VU,DU by MG at 7/17/2024 1021   Family Acceptance, E,TB,D, VU,DU,NR by MG at 7/18/2024 1707                                         User Key       Initials Effective Dates Name Provider Type Discipline     05/24/22 -  Jade Gregory, DARREN Physical Therapist PT                  PT Recommendation and Plan  Planned Therapy Interventions (PT): balance training, bed mobility training, gait training, home exercise program, ROM (range of motion), patient/family education, stair training, strengthening, stretching, transfer training  Plan of Care Reviewed With: patient,  daughter  Progress: improving  Outcome Evaluation: Pt seen for PT tx this PM and tolerated the session well. Pts dtr present during session for family teaching. Today, pt was SBA for bed mobility, CG/SBA for total of 3 STS to RW, SBA/SV for donning/doffing LE clothing, independent w/ pericare, CG/SBA for ambulation of 80' w/ RW and CG/SBA to ascend/descend 4 steps w/ BHR in a non-reciprocal step pattern. No knee buckling, SOB, dizziness or overt LOB during the session. Pt was able to progress her gait mechanics to partial step-through w/ RLE, but cont to have severe pain w/ LLE weight-bearing. Pt performed TKA protocol ther-ex for 10 reps w/ good recall of 3/4 exercises. Edu to pt/dtr on icing, positioning, HEP, stair/gait sequencing/safety, RW management, need/benefit for family supervision/help at least the first day home, energy conservation and falls prevention; they v/u. Pt/dtr state no further concerns for questions for return home. SBAR w/ RN following session. Cont to rec home w/ HH PT and family assist.     Time Calculation:         PT Charges       Row Name 07/18/24 1708             Time Calculation    Start Time 1349  -MG      Stop Time 1428  -MG      Time Calculation (min) 39 min  -MG      PT Received On 07/18/24  -MG      PT - Next Appointment 07/19/24  -MG                User Key  (r) = Recorded By, (t) = Taken By, (c) = Cosigned By      Initials Name Provider Type    MG Jade Gregory, PT Physical Therapist                  Therapy Charges for Today       Code Description Service Date Service Provider Modifiers Qty    65087463358 HC PT EVAL MOD COMPLEXITY 2 7/17/2024 Jade Gregory, PT GP 1    45994514219 HC PT THERAPEUTIC ACT EA 15 MIN 7/17/2024 Jade Gregory, PT GP 1    90696604303 HC GAIT TRAINING EA 15 MIN 7/18/2024 Jaed Gregory, PT GP 1    27254416701 HC PT THERAPEUTIC ACT EA 15 MIN 7/18/2024 Jade Gregory, PT GP 1    00608087911 HC PT THER PROC EA 15 MIN 7/18/2024 Jade Gregory, PT GP 1             PT G-Codes  AM-PAC 6 Clicks Score (PT): 18  PT Discharge Summary  Anticipated Discharge Disposition (PT): home with home health, home with assist    Jade Gregory, PT  7/18/2024

## 2024-07-18 NOTE — PROGRESS NOTES
Orthopaedic Surgery  Progress Note  7/18/2024    Patient Name:  Gela Holden  YOB: 1955  Age: 68 y.o.  Medical Records Number:  [id number]  Date of Admission: 7/16/2024    No new complaints, just appropriate pain and stiffness in the left knee    Vitals:  Vitals:    07/17/24 1529 07/17/24 2041 07/17/24 2115 07/18/24 0515   BP: 123/64  120/62 103/56   BP Location: Left arm  Left arm Left arm   Patient Position: Lying  Lying Lying   Pulse: 67 72 68 68   Resp: 16 18 18 18   Temp: 97.9 °F (36.6 °C)  98.8 °F (37.1 °C) 98.8 °F (37.1 °C)   TempSrc: Oral  Oral Oral   SpO2: 93% 91% 92% 92%   Weight:       Height:           LLE:  NVI, calf nontender, sensation intact  No signs of DVT    Incision: clean, no signs of infection    Lab Results (last 24 hours)       ** No results found for the last 24 hours. **            Assesment/Plan:    Procedures:  Left TKA  Postoperative Day: 2  Weightbearing Status:  WBAT with walker  DVT Prophylaxis:  ASA for DVT prophylaxis    Reason for Inpatient Admission:  I certify that this patient requires inpatient admission for greater than 2 midnights due to fall risk with history of frequent falls    Disposition:  Home with home health after PT today, if comfortable and mobilizing safely    Robert Mcelroy  [facility]  [address] [place], [place] [address] [phone]    7/18/2024

## 2024-07-18 NOTE — CASE MANAGEMENT/SOCIAL WORK
Case Management Discharge Note      Final Note: dc home with [facility]    Provided Post Acute Provider List?: N/A  N/A Provider List Comment: Declined; requests [facility].    Selected Continued Care - Discharged on 7/18/2024 Admission date: 7/16/2024 - Discharge disposition: Home or Self Care      Destination    No services have been selected for the patient.                Durable Medical Equipment    No services have been selected for the patient.                Dialysis/Infusion    No services have been selected for the patient.                Home Medical Care Coordination complete.      Service Provider Selected Services Address Phone Fax Patient Preferred    [facility] [address] [place] [id number] [phone] --              Therapy    No services have been selected for the patient.                Community Resources    No services have been selected for the patient.                Community & DME    No services have been selected for the patient.                    Transportation Services  Private: Car    Final Discharge Disposition Code: 06 - home with home health care

## 2024-07-18 NOTE — PLAN OF CARE
Goal Outcome Evaluation:  Plan of Care Reviewed With: patient, daughter        Progress: improving  Outcome Evaluation: Pt seen for PT tx this PM and tolerated the session well. Pts dtr present during session for family teaching. Today, pt was SBA for bed mobility, CG/SBA for total of 3 STS to RW, SBA/SV for donning/doffing LE clothing, independent w/ pericare, CG/SBA for ambulation of 80' w/ RW and CG/SBA to ascend/descend 4 steps w/ BHR in a non-reciprocal step pattern. No knee buckling, SOB, dizziness or overt LOB during the session. Pt was able to progress her gait mechanics to partial step-through w/ RLE, but cont to have severe pain w/ LLE weight-bearing. Pt performed TKA protocol ther-ex for 10 reps w/ good recall of 3/4 exercises. Edu to pt/dtr on icing, positioning, HEP, stair/gait sequencing/safety, RW management, need/benefit for family supervision/help at least the first day home, energy conservation and falls prevention; they v/u. Pt/dtr state no further concerns for questions for return home. SBAR w/ RN following session. Cont to rec home w/ HH PT and family assist.      Anticipated Discharge Disposition (PT): home with home health, home with assist

## 2024-07-23 ENCOUNTER — TELEPHONE (OUTPATIENT)
Dept: ORTHOPEDIC SURGERY | Facility: HOSPITAL | Age: 69
End: 2024-07-23
Payer: MEDICARE

## 2024-07-23 NOTE — TELEPHONE ENCOUNTER
Attempted to reach Ms. Holden to see how she is doing as she is 1 week SP TKA. Message left at this time.

## 2024-10-21 ENCOUNTER — APPOINTMENT (OUTPATIENT)
Dept: GENERAL RADIOLOGY | Facility: HOSPITAL | Age: 69
End: 2024-10-21
Payer: MEDICARE

## 2024-10-21 ENCOUNTER — HOSPITAL ENCOUNTER (OUTPATIENT)
Facility: HOSPITAL | Age: 69
Setting detail: OBSERVATION
Discharge: HOME | End: 2024-10-23
Attending: EMERGENCY MEDICINE | Admitting: EMERGENCY MEDICINE
Payer: MEDICARE

## 2024-10-21 DIAGNOSIS — E79.0 ELEVATED URIC ACID IN BLOOD: ICD-10-CM

## 2024-10-21 DIAGNOSIS — M10.9 ACUTE GOUT OF RIGHT FOOT, UNSPECIFIED CAUSE: ICD-10-CM

## 2024-10-21 DIAGNOSIS — M79.671 ACUTE FOOT PAIN, RIGHT: Primary | ICD-10-CM

## 2024-10-21 DIAGNOSIS — M17.12 PRIMARY OSTEOARTHRITIS OF LEFT KNEE: ICD-10-CM

## 2024-10-21 DIAGNOSIS — R52 INTRACTABLE PAIN: ICD-10-CM

## 2024-10-21 LAB
ALBUMIN SERPL-MCNC: 4.4 G/DL (ref 3.5–5.2)
ALBUMIN/GLOB SERPL: 1.3 G/DL
ALP SERPL-CCNC: 126 U/L (ref 39–117)
ALT SERPL W P-5'-P-CCNC: 17 U/L (ref 1–33)
ANION GAP SERPL CALCULATED.3IONS-SCNC: 13 MMOL/L (ref 5–15)
AST SERPL-CCNC: 16 U/L (ref 1–32)
BASOPHILS # BLD AUTO: 0.02 10*3/MM3 (ref 0–0.2)
BASOPHILS NFR BLD AUTO: 0.2 % (ref 0–1.5)
BILIRUB SERPL-MCNC: 0.8 MG/DL (ref 0–1.2)
BUN SERPL-MCNC: 14 MG/DL (ref 8–23)
BUN/CREAT SERPL: 11.2 (ref 7–25)
CALCIUM SPEC-SCNC: 9.7 MG/DL (ref 8.6–10.5)
CHLORIDE SERPL-SCNC: 101 MMOL/L (ref 98–107)
CO2 SERPL-SCNC: 27 MMOL/L (ref 22–29)
CREAT SERPL-MCNC: 1.25 MG/DL (ref 0.57–1)
CRP SERPL-MCNC: 1.05 MG/DL (ref 0–0.5)
DEPRECATED RDW RBC AUTO: 47.3 FL (ref 37–54)
EGFRCR SERPLBLD CKD-EPI 2021: 47 ML/MIN/1.73
EOSINOPHIL # BLD AUTO: 0.07 10*3/MM3 (ref 0–0.4)
EOSINOPHIL NFR BLD AUTO: 0.8 % (ref 0.3–6.2)
ERYTHROCYTE [DISTWIDTH] IN BLOOD BY AUTOMATED COUNT: 15.9 % (ref 12.3–15.4)
ERYTHROCYTE [SEDIMENTATION RATE] IN BLOOD: 26 MM/HR (ref 0–30)
GLOBULIN UR ELPH-MCNC: 3.4 GM/DL
GLUCOSE SERPL-MCNC: 98 MG/DL (ref 65–99)
HCT VFR BLD AUTO: 37.3 % (ref 34–46.6)
HGB BLD-MCNC: 11.9 G/DL (ref 12–15.9)
IMM GRANULOCYTES # BLD AUTO: 0.03 10*3/MM3 (ref 0–0.05)
IMM GRANULOCYTES NFR BLD AUTO: 0.3 % (ref 0–0.5)
LYMPHOCYTES # BLD AUTO: 0.8 10*3/MM3 (ref 0.7–3.1)
LYMPHOCYTES NFR BLD AUTO: 8.7 % (ref 19.6–45.3)
MCH RBC QN AUTO: 26.1 PG (ref 26.6–33)
MCHC RBC AUTO-ENTMCNC: 31.9 G/DL (ref 31.5–35.7)
MCV RBC AUTO: 81.8 FL (ref 79–97)
MONOCYTES # BLD AUTO: 0.77 10*3/MM3 (ref 0.1–0.9)
MONOCYTES NFR BLD AUTO: 8.4 % (ref 5–12)
NEUTROPHILS NFR BLD AUTO: 7.46 10*3/MM3 (ref 1.7–7)
NEUTROPHILS NFR BLD AUTO: 81.6 % (ref 42.7–76)
NRBC BLD AUTO-RTO: 0 /100 WBC (ref 0–0.2)
PLATELET # BLD AUTO: 222 10*3/MM3 (ref 140–450)
PMV BLD AUTO: 9.3 FL (ref 6–12)
POTASSIUM SERPL-SCNC: 3.5 MMOL/L (ref 3.5–5.2)
PROT SERPL-MCNC: 7.8 G/DL (ref 6–8.5)
RBC # BLD AUTO: 4.56 10*6/MM3 (ref 3.77–5.28)
SODIUM SERPL-SCNC: 141 MMOL/L (ref 136–145)
URATE SERPL-MCNC: 9.8 MG/DL (ref 2.4–5.7)
WBC NRBC COR # BLD AUTO: 9.15 10*3/MM3 (ref 3.4–10.8)

## 2024-10-21 PROCEDURE — 73630 X-RAY EXAM OF FOOT: CPT

## 2024-10-21 PROCEDURE — G0378 HOSPITAL OBSERVATION PER HR: HCPCS

## 2024-10-21 PROCEDURE — 96375 TX/PRO/DX INJ NEW DRUG ADDON: CPT

## 2024-10-21 PROCEDURE — 85025 COMPLETE CBC W/AUTO DIFF WBC: CPT | Performed by: PHYSICIAN ASSISTANT

## 2024-10-21 PROCEDURE — 86140 C-REACTIVE PROTEIN: CPT | Performed by: PHYSICIAN ASSISTANT

## 2024-10-21 PROCEDURE — 25010000002 KETOROLAC TROMETHAMINE PER 15 MG: Performed by: PHYSICIAN ASSISTANT

## 2024-10-21 PROCEDURE — 80053 COMPREHEN METABOLIC PANEL: CPT | Performed by: PHYSICIAN ASSISTANT

## 2024-10-21 PROCEDURE — 25010000002 MORPHINE PER 10 MG: Performed by: EMERGENCY MEDICINE

## 2024-10-21 PROCEDURE — 25010000002 METHYLPREDNISOLONE PER 125 MG: Performed by: NURSE PRACTITIONER

## 2024-10-21 PROCEDURE — 85652 RBC SED RATE AUTOMATED: CPT | Performed by: PHYSICIAN ASSISTANT

## 2024-10-21 PROCEDURE — 99285 EMERGENCY DEPT VISIT HI MDM: CPT

## 2024-10-21 PROCEDURE — 84550 ASSAY OF BLOOD/URIC ACID: CPT | Performed by: PHYSICIAN ASSISTANT

## 2024-10-21 PROCEDURE — 96374 THER/PROPH/DIAG INJ IV PUSH: CPT

## 2024-10-21 PROCEDURE — 25010000002 HYDROMORPHONE PER 4 MG: Performed by: EMERGENCY MEDICINE

## 2024-10-21 RX ORDER — ERGOCALCIFEROL 1.25 MG/1
50000 CAPSULE, LIQUID FILLED ORAL
Status: DISCONTINUED | OUTPATIENT
Start: 2024-10-27 | End: 2024-10-23 | Stop reason: HOSPADM

## 2024-10-21 RX ORDER — POLYETHYLENE GLYCOL 3350 17 G/17G
17 POWDER, FOR SOLUTION ORAL DAILY PRN
Status: DISCONTINUED | OUTPATIENT
Start: 2024-10-21 | End: 2024-10-23 | Stop reason: HOSPADM

## 2024-10-21 RX ORDER — NIFEDIPINE 60 MG/1
60 TABLET, EXTENDED RELEASE ORAL DAILY
Status: DISCONTINUED | OUTPATIENT
Start: 2024-10-22 | End: 2024-10-23 | Stop reason: HOSPADM

## 2024-10-21 RX ORDER — HYDROMORPHONE HYDROCHLORIDE 1 MG/ML
0.5 INJECTION, SOLUTION INTRAMUSCULAR; INTRAVENOUS; SUBCUTANEOUS ONCE
Status: COMPLETED | OUTPATIENT
Start: 2024-10-21 | End: 2024-10-21

## 2024-10-21 RX ORDER — DICLOFENAC SODIUM 30 MG/G
2 GEL TOPICAL 4 TIMES DAILY
Status: DISCONTINUED | OUTPATIENT
Start: 2024-10-21 | End: 2024-10-23 | Stop reason: HOSPADM

## 2024-10-21 RX ORDER — ASPIRIN 325 MG
325 TABLET, DELAYED RELEASE (ENTERIC COATED) ORAL 2 TIMES DAILY WITH MEALS
Status: DISCONTINUED | OUTPATIENT
Start: 2024-10-21 | End: 2024-10-23 | Stop reason: HOSPADM

## 2024-10-21 RX ORDER — SODIUM CHLORIDE 0.9 % (FLUSH) 0.9 %
10 SYRINGE (ML) INJECTION AS NEEDED
Status: DISCONTINUED | OUTPATIENT
Start: 2024-10-21 | End: 2024-10-23 | Stop reason: HOSPADM

## 2024-10-21 RX ORDER — LETROZOLE 2.5 MG/1
2.5 TABLET, FILM COATED ORAL DAILY
Status: DISCONTINUED | OUTPATIENT
Start: 2024-10-22 | End: 2024-10-23 | Stop reason: HOSPADM

## 2024-10-21 RX ORDER — METHYLPREDNISOLONE SODIUM SUCCINATE 125 MG/2ML
125 INJECTION, POWDER, LYOPHILIZED, FOR SOLUTION INTRAMUSCULAR; INTRAVENOUS ONCE
Status: COMPLETED | OUTPATIENT
Start: 2024-10-21 | End: 2024-10-21

## 2024-10-21 RX ORDER — CETIRIZINE HYDROCHLORIDE 10 MG/1
10 TABLET ORAL DAILY
Status: DISCONTINUED | OUTPATIENT
Start: 2024-10-22 | End: 2024-10-23 | Stop reason: HOSPADM

## 2024-10-21 RX ORDER — BISACODYL 10 MG
10 SUPPOSITORY, RECTAL RECTAL DAILY PRN
Status: DISCONTINUED | OUTPATIENT
Start: 2024-10-21 | End: 2024-10-23 | Stop reason: HOSPADM

## 2024-10-21 RX ORDER — KETOROLAC TROMETHAMINE 15 MG/ML
15 INJECTION, SOLUTION INTRAMUSCULAR; INTRAVENOUS ONCE
Status: COMPLETED | OUTPATIENT
Start: 2024-10-21 | End: 2024-10-21

## 2024-10-21 RX ORDER — NEBIVOLOL 5 MG/1
10 TABLET ORAL DAILY
Status: DISCONTINUED | OUTPATIENT
Start: 2024-10-22 | End: 2024-10-23 | Stop reason: HOSPADM

## 2024-10-21 RX ORDER — SODIUM CHLORIDE 0.9 % (FLUSH) 0.9 %
10 SYRINGE (ML) INJECTION EVERY 12 HOURS SCHEDULED
Status: DISCONTINUED | OUTPATIENT
Start: 2024-10-21 | End: 2024-10-23 | Stop reason: HOSPADM

## 2024-10-21 RX ORDER — ESCITALOPRAM OXALATE 20 MG/1
20 TABLET ORAL DAILY
Status: DISCONTINUED | OUTPATIENT
Start: 2024-10-22 | End: 2024-10-23 | Stop reason: HOSPADM

## 2024-10-21 RX ORDER — BUDESONIDE AND FORMOTEROL FUMARATE DIHYDRATE 160; 4.5 UG/1; UG/1
1 AEROSOL RESPIRATORY (INHALATION)
Status: DISCONTINUED | OUTPATIENT
Start: 2024-10-21 | End: 2024-10-23 | Stop reason: HOSPADM

## 2024-10-21 RX ORDER — AMOXICILLIN 250 MG
2 CAPSULE ORAL 2 TIMES DAILY PRN
Status: DISCONTINUED | OUTPATIENT
Start: 2024-10-21 | End: 2024-10-23 | Stop reason: HOSPADM

## 2024-10-21 RX ORDER — MORPHINE SULFATE 2 MG/ML
4 INJECTION, SOLUTION INTRAMUSCULAR; INTRAVENOUS ONCE
Status: COMPLETED | OUTPATIENT
Start: 2024-10-21 | End: 2024-10-21

## 2024-10-21 RX ORDER — TORSEMIDE 20 MG/1
60 TABLET ORAL DAILY
Status: DISCONTINUED | OUTPATIENT
Start: 2024-10-22 | End: 2024-10-23 | Stop reason: HOSPADM

## 2024-10-21 RX ORDER — BISACODYL 5 MG/1
5 TABLET, DELAYED RELEASE ORAL DAILY PRN
Status: DISCONTINUED | OUTPATIENT
Start: 2024-10-21 | End: 2024-10-23 | Stop reason: HOSPADM

## 2024-10-21 RX ADMIN — KETOROLAC TROMETHAMINE 15 MG: 15 INJECTION, SOLUTION INTRAMUSCULAR; INTRAVENOUS at 16:15

## 2024-10-21 RX ADMIN — METHYLPREDNISOLONE SODIUM SUCCINATE 125 MG: 125 INJECTION INTRAMUSCULAR; INTRAVENOUS at 21:20

## 2024-10-21 RX ADMIN — DICLOFENAC SODIUM 2 G: 30 GEL TOPICAL at 22:25

## 2024-10-21 RX ADMIN — DICLOFENAC SODIUM 2 G: 30 GEL TOPICAL at 18:31

## 2024-10-21 RX ADMIN — HYDROMORPHONE HYDROCHLORIDE 0.5 MG: 1 INJECTION, SOLUTION INTRAMUSCULAR; INTRAVENOUS; SUBCUTANEOUS at 17:32

## 2024-10-21 RX ADMIN — Medication 10 ML: at 22:24

## 2024-10-21 RX ADMIN — MORPHINE SULFATE 4 MG: 2 INJECTION, SOLUTION INTRAMUSCULAR; INTRAVENOUS at 16:16

## 2024-10-21 NOTE — PROGRESS NOTES
MD ATTESTATION NOTE    The KARISSA and I have discussed this patient's history, physical exam, and treatment plan.  I have reviewed the documentation and personally had a face to face interaction with the patient. I affirm the documentation and agree with the treatment and plan.  The attached note describes my personal findings.      I provided a substantive portion of the care of the patient.  I personally performed the physical exam in its entirety, and below are my findings.        Brief HPI: This patient is a 66-year-old female with a history of osteoarthritis being admitted to the observation unit for treatment and management of right foot pain and swelling.  She denies any previous history of this.  She states that the pain began last night but was significantly worse when she woke this morning and was unable to bear weight due to the pain.  She denies any trauma or fever/chills.      PHYSICAL EXAM  ED Triage Vitals   Temp Heart Rate Resp BP SpO2   10/21/24 1510 10/21/24 1510 10/21/24 1510 10/21/24 1552 10/21/24 1510   100.3 °F (37.9 °C) 77 16 138/95 98 %      Temp src Heart Rate Source Patient Position BP Location FiO2 (%)   -- 10/21/24 1552 10/21/24 1552 10/21/24 1552 --    Monitor Sitting Left arm          GENERAL: Resting comfortably and in no acute distress, nontoxic in appearance  HENT: nares patent  EYES: no scleral icterus  CV: regular rhythm, normal rate, no M/R/G  RESPIRATORY: normal effort, lungs clear bilaterally  ABDOMEN: soft, nontender, no rebound or guarding  MUSCULOSKELETAL: no deformity, right foot swelling with tenderness to palpation diffusely.  No erythema or warmth, pulses palpable, compartments soft  NEURO: alert, moves all extremities, follows commands  PSYCH:  calm, cooperative  SKIN: warm, dry    Vital signs and nursing notes reviewed.        Plan: We will admit the patient to treat her pain as well as obtain an MRI of the right foot, Doppler ultrasound of the right lower extremity, as  well as ABIs for assessment.  We will ask podiatry to see in morning consultation.  Further plan to follow.

## 2024-10-21 NOTE — ED NOTES
Patient to ER via car from home for woke up with r foot pain  States can not put weight on it  No known injury

## 2024-10-21 NOTE — ED PROVIDER NOTES
EMERGENCY DEPARTMENT ENCOUNTER      Room Number:  S01/01  PCP: Orion Live MD  Independent Historians: Patient and Friend  Patient Care Team:  Orion Live MD as PCP - General (Internal Medicine)       HPI:  Chief Complaint: Foot pain    A complete HPI/ROS/PMH/PSH/SH/FH are unobtainable due to: None    Chronic or social conditions impacting patient care (Social Determinants of Health): None      Context: Gela Holden is a 69 y.o. female with a PMH significant for arthritis, chronic back pain who presents to the ED c/o acute right foot pain.  The patient went to bed last night feeling asymptomatic but when she woke up this morning she was feeling intense aching discomfort diffusely to the right foot.  Pain is significantly worse when she tries to stand or ambulate.  She tried using tramadol at home this morning without relief of her symptoms.  No swelling, bruising, discoloration of any kind.  No nausea, vomiting.  No injury that she can remember.      Upon review of prior external notes (non-ED) -and- Review of prior external test results outside of this encounter it appears the patient was evaluated in the office with family medicine for peripheral edema on September 23, 2024.  The patient had a normal urinalysis on 7/2/2024 and a normal vitamin B12 level on 6/24/2024.      PAST MEDICAL HISTORY  Active Ambulatory Problems     Diagnosis Date Noted    Primary osteoarthritis of left knee 07/16/2024     Resolved Ambulatory Problems     Diagnosis Date Noted    No Resolved Ambulatory Problems     Past Medical History:   Diagnosis Date    Anesthesia     Arthritis     At risk for sleep apnea     Cancer 12/2021    Cellulitis     Chronic back pain     Depression     Disease of thyroid gland     Exercise intolerance     GERD (gastroesophageal reflux disease)     Hypertension     Knee pain     Limited joint range of motion     Stress incontinence     Varicose veins of left lower extremity     Vitamin D  deficiency          PAST SURGICAL HISTORY  Past Surgical History:   Procedure Laterality Date    BREAST BIOPSY Right 12/2021    BREAST LUMPECTOMY Right 02/2022    COLONOSCOPY  2019    TOTAL KNEE ARTHROPLASTY Left 7/16/2024    Procedure: TOTAL KNEE ARTHROPLASTY;  Surgeon: Robert Glover MD;  [place] [facility] [place] [facility] [place]: [place]opedics;  Laterality: Left;    TUBAL COAGULATION LAPAROSCOPIC Bilateral     VENTRAL HERNIA REPAIR  03/2015         FAMILY HISTORY  Family History   Problem Relation Age of Onset    Malig Hyperthermia Neg Hx          SOCIAL HISTORY  Social History     Socioeconomic History    Marital status: Single   Tobacco Use    Smoking status: Never    Smokeless tobacco: Never   Vaping Use    Vaping status: Never Used   Substance and Sexual Activity    Alcohol use: Not Currently     Comment: OCCAS    Drug use: Never    Sexual activity: Defer         ALLERGIES  Penicillins and Sulfa antibiotics      REVIEW OF SYSTEMS  Included in HPI  All systems reviewed and negative except for those discussed in HPI.      PHYSICAL EXAM    I have reviewed the triage vital signs and nursing notes.    ED Triage Vitals   Temp Heart Rate Resp BP SpO2   10/21/24 1510 10/21/24 1510 10/21/24 1510 10/21/24 1552 10/21/24 1510   100.3 °F (37.9 °C) 77 16 138/95 98 %      Temp src Heart Rate Source Patient Position BP Location FiO2 (%)   -- 10/21/24 1552 10/21/24 1552 10/21/24 1552 --    Monitor Sitting Left arm        Physical Exam  Constitutional:       General: She is not in acute distress.     Appearance: She is well-developed.   HENT:      Head: Normocephalic and atraumatic.   Eyes:      General: No scleral icterus.     Conjunctiva/sclera: Conjunctivae normal.   Neck:      Trachea: No tracheal deviation.   Cardiovascular:      Rate and Rhythm: Normal rate and regular rhythm.      Pulses:           Dorsalis pedis pulses are 2+ on the right side and 2+ on the left side.   Pulmonary:      Effort: Pulmonary effort is normal.       Breath sounds: Normal breath sounds.   Abdominal:      Palpations: Abdomen is soft.      Tenderness: There is no abdominal tenderness.   Musculoskeletal:         General: No deformity.      Cervical back: Normal range of motion.      Right foot: Decreased range of motion. Tenderness present. No swelling or deformity.   Lymphadenopathy:      Cervical: No cervical adenopathy.   Skin:     General: Skin is warm and dry.   Neurological:      Mental Status: She is alert and oriented to person, place, and time.   Psychiatric:         Behavior: Behavior normal.         Vital signs and nursing notes reviewed.      PPE: I wore a surgical mask throughout my encounters with the pt. I performed hand hygiene on entry into the pt room and upon exit.     LAB RESULTS  Recent Results (from the past 24 hours)   Comprehensive Metabolic Panel    Collection Time: 10/21/24  4:09 PM    Specimen: Blood   Result Value Ref Range    Glucose 98 65 - 99 mg/dL    BUN 14 8 - 23 mg/dL    Creatinine 1.25 (H) 0.57 - 1.00 mg/dL    Sodium 141 136 - 145 mmol/L    Potassium 3.5 3.5 - 5.2 mmol/L    Chloride 101 98 - 107 mmol/L    CO2 27.0 22.0 - 29.0 mmol/L    Calcium 9.7 8.6 - 10.5 mg/dL    Total Protein 7.8 6.0 - 8.5 g/dL    Albumin 4.4 3.5 - 5.2 g/dL    ALT (SGPT) 17 1 - 33 U/L    AST (SGOT) 16 1 - 32 U/L    Alkaline Phosphatase 126 (H) 39 - 117 U/L    Total Bilirubin 0.8 0.0 - 1.2 mg/dL    Globulin 3.4 gm/dL    A/G Ratio 1.3 g/dL    BUN/Creatinine Ratio 11.2 7.0 - 25.0    Anion Gap 13.0 5.0 - 15.0 mmol/L    eGFR 47.0 (L) >60.0 mL/min/1.73   Uric Acid    Collection Time: 10/21/24  4:09 PM    Specimen: Blood   Result Value Ref Range    Uric Acid 9.8 (H) 2.4 - 5.7 mg/dL   CBC Auto Differential    Collection Time: 10/21/24  4:09 PM    Specimen: Blood   Result Value Ref Range    WBC 9.15 3.40 - 10.80 10*3/mm3    RBC 4.56 3.77 - 5.28 10*6/mm3    Hemoglobin 11.9 (L) 12.0 - 15.9 g/dL    Hematocrit 37.3 34.0 - 46.6 %    MCV 81.8 79.0 - 97.0 fL    MCH 26.1 (L)  26.6 - 33.0 pg    MCHC 31.9 31.5 - 35.7 g/dL    RDW 15.9 (H) 12.3 - 15.4 %    RDW-SD 47.3 37.0 - 54.0 fl    MPV 9.3 6.0 - 12.0 fL    Platelets 222 140 - 450 10*3/mm3    Neutrophil % 81.6 (H) 42.7 - 76.0 %    Lymphocyte % 8.7 (L) 19.6 - 45.3 %    Monocyte % 8.4 5.0 - 12.0 %    Eosinophil % 0.8 0.3 - 6.2 %    Basophil % 0.2 0.0 - 1.5 %    Immature Grans % 0.3 0.0 - 0.5 %    Neutrophils, Absolute 7.46 (H) 1.70 - 7.00 10*3/mm3    Lymphocytes, Absolute 0.80 0.70 - 3.10 10*3/mm3    Monocytes, Absolute 0.77 0.10 - 0.90 10*3/mm3    Eosinophils, Absolute 0.07 0.00 - 0.40 10*3/mm3    Basophils, Absolute 0.02 0.00 - 0.20 10*3/mm3    Immature Grans, Absolute 0.03 0.00 - 0.05 10*3/mm3    nRBC 0.0 0.0 - 0.2 /100 WBC   C-reactive Protein    Collection Time: 10/21/24  4:09 PM    Specimen: Blood   Result Value Ref Range    C-Reactive Protein 1.05 (H) 0.00 - 0.50 mg/dL   Sedimentation Rate    Collection Time: 10/21/24  4:09 PM    Specimen: Blood   Result Value Ref Range    Sed Rate 26 0 - 30 mm/hr         RADIOLOGY  XR Foot 3+ View Right    Result Date: 10/21/2024  XR FOOT 3+ VW RIGHT-  INDICATIONS: Pain   TECHNIQUE: 3 VIEWS OF THE RIGHT FOOT  COMPARISON: None available  FINDINGS:  No acute fracture, erosion, or dislocation is identified. Increased hallux valgus angulation is apparent, nonweightbearing exam. Small calcaneal spurring and anterior tibial spurring are noted. Follow-up/further evaluation can be obtained as indications persist.       As described.    This report was finalized on 10/21/2024 3:53 PM by Dr. Jorge Alberto Pino M.D on Workstation: [id number]         MEDICATIONS GIVEN IN ER  Medications   sodium chloride 0.9 % flush 10 mL (has no administration in time range)   Diclofenac Sodium 3 % gel 2 g (has no administration in time range)   sodium chloride 0.9 % flush 10 mL (has no administration in time range)   sodium chloride 0.9 % flush 10 mL (has no administration in time range)   sennosides-docusate (PERICOLACE) 8.6-50  MG per tablet 2 tablet (has no administration in time range)     And   polyethylene glycol (MIRALAX) packet 17 g (has no administration in time range)     And   bisacodyl (DULCOLAX) EC tablet 5 mg (has no administration in time range)     And   bisacodyl (DULCOLAX) suppository 10 mg (has no administration in time range)   methylPREDNISolone sodium succinate (SOLU-Medrol) injection 125 mg (has no administration in time range)   ketorolac (TORADOL) injection 15 mg (15 mg Intravenous Given 10/21/24 1615)   morphine injection 4 mg (4 mg Intravenous Given 10/21/24 1616)   HYDROmorphone (DILAUDID) injection 0.5 mg (0.5 mg Intravenous Given 10/21/24 1732)         ORDERS PLACED DURING THIS VISIT:  Orders Placed This Encounter   Procedures    XR Foot 3+ View Right    MRI Foot Right With & Without Contrast    Comprehensive Metabolic Panel    Uric Acid    CBC Auto Differential    C-reactive Protein    Sedimentation Rate    CBC (No Diff)    Comprehensive Metabolic Panel    Diet: Regular/House; Fluid Consistency: Thin (IDDSI 0)    NPO Diet NPO Type: Strict NPO    Up With Assistance    Intake & Output    Weigh Patient    Oral Care    Saline Lock & Maintain IV Access    Place Sequential Compression Device    Maintain Sequential Compression Device    Vital Signs    Inpatient Podiatry Consult    PT Consult: Eval & Treat Functional Mobility Below Baseline    Insert Peripheral IV    Insert Peripheral IV    Initiate ED Observation Status    CBC & Differential         OUTPATIENT MEDICATION MANAGEMENT:  Current Facility-Administered Medications Ordered in Epic   Medication Dose Route Frequency Provider Last Rate Last Admin    sennosides-docusate (PERICOLACE) 8.6-50 MG per tablet 2 tablet  2 tablet Oral BID PRN Marlen Sebastian, FIONA        And    polyethylene glycol (MIRALAX) packet 17 g  17 g Oral Daily PRN Marlen Sebastian, APRN        And    bisacodyl (DULCOLAX) EC tablet 5 mg  5 mg Oral Daily PRN Marlen Sebastian APRN        And    bisacodyl  (DULCOLAX) suppository 10 mg  10 mg Rectal Daily PRN Marlen Sebastian APRN        Diclofenac Sodium 3 % gel 2 g  2 g Topical 4x Daily Gino Kaur PA        methylPREDNISolone sodium succinate (SOLU-Medrol) injection 125 mg  125 mg Intravenous Once Marlen Sebastian APRN        sodium chloride 0.9 % flush 10 mL  10 mL Intravenous PRN Gino Kaur PA        sodium chloride 0.9 % flush 10 mL  10 mL Intravenous Q12H Marlen Sebastian APRN        sodium chloride 0.9 % flush 10 mL  10 mL Intravenous PRN Marlen Sebastian APRN         Current Outpatient Medications Ordered in Epic   Medication Sig Dispense Refill    aspirin (Matheus Aspirin) 325 MG EC tablet Take 1 tablet by mouth 2 (Two) Times a Day With Meals. 60 tablet 0    budesonide-formoterol (SYMBICORT) 80-4.5 MCG/ACT inhaler Inhale 2 puffs 2 (Two) Times a Day.      Cetirizine HCl 10 MG capsule Take 10 mg by mouth As Needed.      docusate sodium (COLACE) 250 MG capsule Take 1 capsule by mouth 2 (Two) Times a Day As Needed for Constipation. 30 capsule 1    escitalopram (LEXAPRO) 20 MG tablet Take 1 tablet by mouth Daily.      famotidine (PEPCID) 40 MG tablet Take 1 tablet by mouth As Needed for Heartburn.      letrozole (FEMARA) 2.5 MG tablet Take 1 tablet by mouth Daily.      levothyroxine (SYNTHROID, LEVOTHROID) 75 MCG tablet Take 1 tablet by mouth Daily.      nebivolol (BYSTOLIC) 10 MG tablet Take 1 tablet by mouth Daily.      NIFEdipine XL (PROCARDIA XL) 60 MG 24 hr tablet Take 1 tablet by mouth Daily.      oxyCODONE-acetaminophen (PERCOCET) 5-325 MG per tablet Take 1-2 tablets by mouth Every 4 (Four) Hours As Needed (pain). 42 tablet 0    torsemide (DEMADEX) 20 MG tablet Take 3 tablets by mouth Daily. TAKES 3  20 MG TABLETS      vitamin D (ERGOCALCIFEROL) 1.25 MG (66826 UT) capsule capsule Take 1 capsule by mouth Every 7 (Seven) Days. TAKES ON SUNDAY               PROGRESS, DATA ANALYSIS, CONSULTS, AND MEDICAL DECISION MAKING  All labs have been independently  interpreted by me.  All radiology studies have been reviewed by me. All EKG's have been independently viewed and interpreted by me.  Discussion below represents my analysis of pertinent findings related to patient's condition, differential diagnosis, treatment plan and final disposition.      DIFFERENTIAL DIAGNOSIS INCLUDE BUT NOT LIMITED TO:     Foot fracture, foot contusion, gout    Clinical Scores: N/A      ED Course as of 10/21/24 1815   Mon Oct 21, 2024   1704 Patient reassessed at this time.  Symptoms were improved with the initial doses of medications but have since returned.  She is very uncomfortable once again and crying out in pain.  New medications ordered for pain control at this time. [DC]   1742 C-Reactive Protein(!): 1.05 [DC]   1742 Sed Rate: 26 [DC]   1742 Uric Acid(!): 9.8 [DC]   1813 Patient presentation and evaluation consistent with acute right foot pain from uncertain etiology.  She does have an elevated blood uric acid level which raises the concern for possible gout but her symptoms are not highly indicative of gout.  They are mostly diffuse to the right foot and not localized to a joint.  She has immense pain despite the use of multiple doses of narcotics and is unable to stand or bear weight on the foot currently.  I do of concern that she will not be able to take care of herself at home as she lives alone and cannot stand or walk.  I feel she would benefit from admission for continued symptom control through the night, foot and ankle surgery consultation in the morning, possible physical therapy and rehab placement if needed. [DC]      ED Course User Index  [DC] Gino Kaur PA         1815 I rechecked the patient.  I discussed the patient's labs, radiology findings (including all incidental findings), diagnosis, and plan for admission. The patient understands and agrees with the plan.      AS OF 18:15 EDT VITALS:    BP - 138/95  HR - 78  TEMP - 100.3 °F (37.9 °C)  O2 SATS -  98%    COMPLEXITY OF CARE  The patient requires admission.      DIAGNOSIS  Final diagnoses:   Acute foot pain, right   Elevated uric acid in blood   Intractable pain         DISPOSITION  ED Disposition       ED Disposition   Decision to Admit    Condition   --    Comment   --                Please note that portions of this document were completed with a voice recognition program.    Note Disclaimer: At [facility], we believe that sharing information builds trust and better relationships. You are receiving this note because you recently visited [facility]. It is possible you will see health information before a provider has talked with you about it. This kind of information can be easy to misunderstand. To help you fully understand what it means for your health, we urge you to discuss this note with your provider.         Gino Kaur PA  10/21/24 1819

## 2024-10-21 NOTE — ED NOTES
Pt c/o right foot pain that started this am when she woke. Pt tender to palpation, worse with ambulation. No known injury

## 2024-10-21 NOTE — ED NOTES
Nursing report ED to floor  Gela Holden  69 y.o.  female    HPI :  HPI  Stated Reason for Visit: foot pain    Chief Complaint  Chief Complaint   Patient presents with    Foot Pain       Admitting doctor:   Arley Butt MD    Admitting diagnosis:   The primary encounter diagnosis was Acute foot pain, right. Diagnoses of Elevated uric acid in blood and Intractable pain were also pertinent to this visit.    Code status:   Current Code Status       Date Active Code Status Order ID Comments User Context       Prior            Allergies:   Penicillins and Sulfa antibiotics    Isolation:   No active isolations    Intake and Output  No intake or output data in the 24 hours ending 10/21/24 1836    Weight:   There were no vitals filed for this visit.    Most recent vitals:   Vitals:    10/21/24 1510 10/21/24 1552 10/21/24 1833   BP:  138/95 143/72   BP Location:  Left arm Left arm   Patient Position:  Sitting Lying   Pulse: 77 78 56   Resp: 16 18 16   Temp: 100.3 °F (37.9 °C)     SpO2: 98%  99%       Active LDAs/IV Access:   Lines, Drains & Airways       Active LDAs       Name Placement date Placement time Site Days    Peripheral IV 10/21/24 1609 Left Antecubital 10/21/24  1609  Antecubital  less than 1                    Labs (abnormal labs have a star):   Labs Reviewed   COMPREHENSIVE METABOLIC PANEL - Abnormal; Notable for the following components:       Result Value    Creatinine 1.25 (*)     Alkaline Phosphatase 126 (*)     eGFR 47.0 (*)     All other components within normal limits    Narrative:     GFR Normal >60  Chronic Kidney Disease <60  Kidney Failure <15     URIC ACID - Abnormal; Notable for the following components:    Uric Acid 9.8 (*)     All other components within normal limits   CBC WITH AUTO DIFFERENTIAL - Abnormal; Notable for the following components:    Hemoglobin 11.9 (*)     MCH 26.1 (*)     RDW 15.9 (*)     Neutrophil % 81.6 (*)     Lymphocyte % 8.7 (*)     Neutrophils, Absolute 7.46 (*)      All other components within normal limits   C-REACTIVE PROTEIN - Abnormal; Notable for the following components:    C-Reactive Protein 1.05 (*)     All other components within normal limits   SEDIMENTATION RATE - Normal   CBC AND DIFFERENTIAL    Narrative:     The following orders were created for panel order CBC & Differential.  Procedure                               Abnormality         Status                     ---------                               -----------         ------                     CBC Auto Differential[[id number]]        Abnormal            Final result                 Please view results for these tests on the individual orders.       EKG:   No orders to display       Meds given in ED:   Medications   sodium chloride 0.9 % flush 10 mL (has no administration in time range)   Diclofenac Sodium 3 % gel 2 g (2 g Topical Given 10/21/24 1831)   sodium chloride 0.9 % flush 10 mL (has no administration in time range)   sodium chloride 0.9 % flush 10 mL (has no administration in time range)   sennosides-docusate (PERICOLACE) 8.6-50 MG per tablet 2 tablet (has no administration in time range)     And   polyethylene glycol (MIRALAX) packet 17 g (has no administration in time range)     And   bisacodyl (DULCOLAX) EC tablet 5 mg (has no administration in time range)     And   bisacodyl (DULCOLAX) suppository 10 mg (has no administration in time range)   methylPREDNISolone sodium succinate (SOLU-Medrol) injection 125 mg (has no administration in time range)   ketorolac (TORADOL) injection 15 mg (15 mg Intravenous Given 10/21/24 1615)   morphine injection 4 mg (4 mg Intravenous Given 10/21/24 1616)   HYDROmorphone (DILAUDID) injection 0.5 mg (0.5 mg Intravenous Given 10/21/24 1732)       Imaging results:  XR Foot 3+ View Right    Result Date: 10/21/2024   As described.    This report was finalized on 10/21/2024 3:53 PM by Dr. Jorge Alberto Pino M.D on Workstation: [id number]       Ambulatory status:   -  assist    Social issues:   Social History     Socioeconomic History    Marital status: Single   Tobacco Use    Smoking status: Never    Smokeless tobacco: Never   Vaping Use    Vaping status: Never Used   Substance and Sexual Activity    Alcohol use: Not Currently     Comment: OCCAS    Drug use: Never    Sexual activity: Defer       Peripheral Neurovascular  Peripheral Neurovascular (Adult)  Peripheral Neurovascular WDL: WDL    Neuro Cognitive  Neuro Cognitive (Adult)  Cognitive/Neuro/Behavioral WDL: WDL    Learning  Learning Assessment  Learning Readiness and Ability: no barriers identified    Respiratory  Respiratory WDL  Respiratory WDL: WDL    Abdominal Pain       Pain Assessments  Pain (Adult)  (0-10) Pain Rating: Rest: 4    NIH Stroke Scale       Shanel Hubbard RN  10/21/24 18:36 EDT

## 2024-10-21 NOTE — ED PROVIDER NOTES
MD ATTESTATION NOTE     SHARED VISIT: This visit was performed by BOTH a physician and an APC. The substantive portion of the medical decision making was performed by this attesting physician who made or approved the management plan and takes responsibility for patient management. All studies in the APC note (if performed) were independently interpreted by me.  The KARISSA and I have discussed this patient's history, physical exam, and treatment plan. I have reviewed the documentation and personally had a face to face interaction with the patient. I affirm the documentation and agree with the treatment and plan. I provided a substantive portion of the care of the patient.  I personally performed the physical exam in its entirety, and below are my findings.      Brief HPI: Patient complains of acute right foot pain.  Pain began last night but was much worse this morning.  She denies any recent injury.  Pain is worse with weightbearing.  Denies fever, chills, shortness of breath, chest pain, or leg swelling.  Denies history of gout.    PHYSICAL EXAM    GENERAL: Awake, alert, oriented x 3.  No acute distress  HENT: nares patent  EYES: no scleral icterus  CV: regular rhythm, normal rate, normal right pedal pulses  RESPIRATORY: normal effort, CTAB  ABDOMEN: soft  MUSCULOSKELETAL: There is diffuse tenderness of the right foot.  There is no significant swelling.  There is no overlying erythema/warmth.  NEURO: alert, moves all extremities, follows commands  PSYCH:  calm, cooperative  SKIN: warm, dry    Vital signs and nursing notes reviewed.        Plan: Patient complains of acute right foot pain.  Uric acid is elevated.  Sed rate and white blood cell count are normal.  CRP is pending.  Right foot x-rays are negative acute.    Right foot x-rays personally interpreted by me.  My personal interpretation is: No fracture.  No dislocation    ED Course as of 10/21/24 2313   Mon Oct 21, 2024   5025 Patient reassessed at this time.   Symptoms were improved with the initial doses of medications but have since returned.  She is very uncomfortable once again and crying out in pain.  New medications ordered for pain control at this time. [DC]   1742 C-Reactive Protein(!): 1.05 [DC]   1742 Sed Rate: 26 [DC]   1742 Uric Acid(!): 9.8 [DC]   1813 Patient presentation and evaluation consistent with acute right foot pain from uncertain etiology.  She does have an elevated blood uric acid level which raises the concern for possible gout but her symptoms are not highly indicative of gout.  They are mostly diffuse to the right foot and not localized to a joint.  She has immense pain despite the use of multiple doses of narcotics and is unable to stand or bear weight on the foot currently.  I do of concern that she will not be able to take care of herself at home as she lives alone and cannot stand or walk.  I feel she would benefit from admission for continued symptom control through the night, foot and ankle surgery consultation in the morning, possible physical therapy and rehab placement if needed. [DC]      ED Course User Index  [DC] Gino Kaur PA Holland, William D, MD  10/21/24 0173

## 2024-10-22 ENCOUNTER — APPOINTMENT (OUTPATIENT)
Dept: CARDIOLOGY | Facility: HOSPITAL | Age: 69
End: 2024-10-22
Payer: MEDICARE

## 2024-10-22 ENCOUNTER — APPOINTMENT (OUTPATIENT)
Dept: MRI IMAGING | Facility: HOSPITAL | Age: 69
End: 2024-10-22
Payer: MEDICARE

## 2024-10-22 PROBLEM — M10.9 ACUTE GOUT: Status: ACTIVE | Noted: 2024-10-22

## 2024-10-22 LAB
ALBUMIN SERPL-MCNC: 3.5 G/DL (ref 3.5–5.2)
ALBUMIN/GLOB SERPL: 0.9 G/DL
ALP SERPL-CCNC: 109 U/L (ref 39–117)
ALT SERPL W P-5'-P-CCNC: 14 U/L (ref 1–33)
ANION GAP SERPL CALCULATED.3IONS-SCNC: 12.4 MMOL/L (ref 5–15)
AST SERPL-CCNC: 17 U/L (ref 1–32)
BH CV LOWER ARTERIAL LEFT ABI RATIO: 1.18
BH CV LOWER ARTERIAL LEFT DORSALIS PEDIS SYS MAX: 161
BH CV LOWER ARTERIAL LEFT GREAT TOE SYS MAX: 140
BH CV LOWER ARTERIAL LEFT POST TIBIAL SYS MAX: 149
BH CV LOWER ARTERIAL LEFT TBI RATIO: 1.03
BH CV LOWER ARTERIAL RIGHT ABI RATIO: 1.18
BH CV LOWER ARTERIAL RIGHT DORSALIS PEDIS SYS MAX: 157
BH CV LOWER ARTERIAL RIGHT GREAT TOE SYS MAX: 118
BH CV LOWER ARTERIAL RIGHT POST TIBIAL SYS MAX: 161
BH CV LOWER ARTERIAL RIGHT TBI RATIO: 0.87
BH CV LOWER VASCULAR LEFT COMMON FEMORAL AUGMENT: NORMAL
BH CV LOWER VASCULAR LEFT COMMON FEMORAL COMPETENT: NORMAL
BH CV LOWER VASCULAR LEFT COMMON FEMORAL COMPRESS: NORMAL
BH CV LOWER VASCULAR LEFT COMMON FEMORAL PHASIC: NORMAL
BH CV LOWER VASCULAR LEFT COMMON FEMORAL SPONT: NORMAL
BH CV LOWER VASCULAR RIGHT COMMON FEMORAL AUGMENT: NORMAL
BH CV LOWER VASCULAR RIGHT COMMON FEMORAL COMPETENT: NORMAL
BH CV LOWER VASCULAR RIGHT COMMON FEMORAL COMPRESS: NORMAL
BH CV LOWER VASCULAR RIGHT COMMON FEMORAL PHASIC: NORMAL
BH CV LOWER VASCULAR RIGHT COMMON FEMORAL SPONT: NORMAL
BH CV LOWER VASCULAR RIGHT DISTAL FEMORAL COMPRESS: NORMAL
BH CV LOWER VASCULAR RIGHT GASTRONEMIUS COMPRESS: NORMAL
BH CV LOWER VASCULAR RIGHT GREATER SAPH AK COMPRESS: NORMAL
BH CV LOWER VASCULAR RIGHT GREATER SAPH BK COMPRESS: NORMAL
BH CV LOWER VASCULAR RIGHT LESSER SAPH COMPRESS: NORMAL
BH CV LOWER VASCULAR RIGHT MID FEMORAL AUGMENT: NORMAL
BH CV LOWER VASCULAR RIGHT MID FEMORAL COMPETENT: NORMAL
BH CV LOWER VASCULAR RIGHT MID FEMORAL COMPRESS: NORMAL
BH CV LOWER VASCULAR RIGHT MID FEMORAL PHASIC: NORMAL
BH CV LOWER VASCULAR RIGHT MID FEMORAL SPONT: NORMAL
BH CV LOWER VASCULAR RIGHT PERONEAL COMPRESS: NORMAL
BH CV LOWER VASCULAR RIGHT POPLITEAL AUGMENT: NORMAL
BH CV LOWER VASCULAR RIGHT POPLITEAL COMPETENT: NORMAL
BH CV LOWER VASCULAR RIGHT POPLITEAL COMPRESS: NORMAL
BH CV LOWER VASCULAR RIGHT POPLITEAL PHASIC: NORMAL
BH CV LOWER VASCULAR RIGHT POPLITEAL SPONT: NORMAL
BH CV LOWER VASCULAR RIGHT POSTERIOR TIBIAL COMPRESS: NORMAL
BH CV LOWER VASCULAR RIGHT PROFUNDA FEMORAL COMPRESS: NORMAL
BH CV LOWER VASCULAR RIGHT PROXIMAL FEMORAL COMPRESS: NORMAL
BH CV LOWER VASCULAR RIGHT SAPHENOFEMORAL JUNCTION COMPRESS: NORMAL
BH CV VAS PRELIMINARY FINDINGS SCRIPTING: 1
BILIRUB SERPL-MCNC: 0.6 MG/DL (ref 0–1.2)
BUN SERPL-MCNC: 17 MG/DL (ref 8–23)
BUN/CREAT SERPL: 14.3 (ref 7–25)
CALCIUM SPEC-SCNC: 9.4 MG/DL (ref 8.6–10.5)
CHLORIDE SERPL-SCNC: 102 MMOL/L (ref 98–107)
CO2 SERPL-SCNC: 21.6 MMOL/L (ref 22–29)
CREAT SERPL-MCNC: 1.19 MG/DL (ref 0.57–1)
DEPRECATED RDW RBC AUTO: 46 FL (ref 37–54)
EGFRCR SERPLBLD CKD-EPI 2021: 49.9 ML/MIN/1.73
ERYTHROCYTE [DISTWIDTH] IN BLOOD BY AUTOMATED COUNT: 15.8 % (ref 12.3–15.4)
GLOBULIN UR ELPH-MCNC: 3.8 GM/DL
GLUCOSE SERPL-MCNC: 161 MG/DL (ref 65–99)
HCT VFR BLD AUTO: 33.8 % (ref 34–46.6)
HGB BLD-MCNC: 11.7 G/DL (ref 12–15.9)
MCH RBC QN AUTO: 28 PG (ref 26.6–33)
MCHC RBC AUTO-ENTMCNC: 34.6 G/DL (ref 31.5–35.7)
MCV RBC AUTO: 80.9 FL (ref 79–97)
PLATELET # BLD AUTO: 202 10*3/MM3 (ref 140–450)
PMV BLD AUTO: 10.2 FL (ref 6–12)
POTASSIUM SERPL-SCNC: 4.3 MMOL/L (ref 3.5–5.2)
PROT SERPL-MCNC: 7.3 G/DL (ref 6–8.5)
RBC # BLD AUTO: 4.18 10*6/MM3 (ref 3.77–5.28)
SODIUM SERPL-SCNC: 136 MMOL/L (ref 136–145)
UPPER ARTERIAL LEFT ARM BRACHIAL SYS MAX: 136
WBC NRBC COR # BLD AUTO: 6.12 10*3/MM3 (ref 3.4–10.8)

## 2024-10-22 PROCEDURE — 80053 COMPREHEN METABOLIC PANEL: CPT | Performed by: NURSE PRACTITIONER

## 2024-10-22 PROCEDURE — G0378 HOSPITAL OBSERVATION PER HR: HCPCS

## 2024-10-22 PROCEDURE — 0 GADOBENATE DIMEGLUMINE 529 MG/ML SOLUTION: Performed by: STUDENT IN AN ORGANIZED HEALTH CARE EDUCATION/TRAINING PROGRAM

## 2024-10-22 PROCEDURE — 93922 UPR/L XTREMITY ART 2 LEVELS: CPT | Performed by: STUDENT IN AN ORGANIZED HEALTH CARE EDUCATION/TRAINING PROGRAM

## 2024-10-22 PROCEDURE — 97162 PT EVAL MOD COMPLEX 30 MIN: CPT

## 2024-10-22 PROCEDURE — 93971 EXTREMITY STUDY: CPT

## 2024-10-22 PROCEDURE — 93971 EXTREMITY STUDY: CPT | Performed by: STUDENT IN AN ORGANIZED HEALTH CARE EDUCATION/TRAINING PROGRAM

## 2024-10-22 PROCEDURE — 94799 UNLISTED PULMONARY SVC/PX: CPT

## 2024-10-22 PROCEDURE — A9577 INJ MULTIHANCE: HCPCS | Performed by: STUDENT IN AN ORGANIZED HEALTH CARE EDUCATION/TRAINING PROGRAM

## 2024-10-22 PROCEDURE — 93923 UPR/LXTR ART STDY 3+ LVLS: CPT

## 2024-10-22 PROCEDURE — 85027 COMPLETE CBC AUTOMATED: CPT | Performed by: NURSE PRACTITIONER

## 2024-10-22 PROCEDURE — 73723 MRI JOINT LWR EXTR W/O&W/DYE: CPT

## 2024-10-22 PROCEDURE — 25010000002 METHYLPREDNISOLONE PER 125 MG: Performed by: NURSE PRACTITIONER

## 2024-10-22 PROCEDURE — 96376 TX/PRO/DX INJ SAME DRUG ADON: CPT

## 2024-10-22 PROCEDURE — 93922 UPR/L XTREMITY ART 2 LEVELS: CPT

## 2024-10-22 PROCEDURE — 97530 THERAPEUTIC ACTIVITIES: CPT

## 2024-10-22 RX ORDER — OXYCODONE AND ACETAMINOPHEN 5; 325 MG/1; MG/1
1-2 TABLET ORAL EVERY 4 HOURS PRN
Qty: 12 TABLET | Refills: 0 | Status: SHIPPED | OUTPATIENT
Start: 2024-10-22

## 2024-10-22 RX ORDER — ONDANSETRON 4 MG/1
4 TABLET, ORALLY DISINTEGRATING ORAL EVERY 8 HOURS PRN
Qty: 30 TABLET | Refills: 0 | Status: SHIPPED | OUTPATIENT
Start: 2024-10-22

## 2024-10-22 RX ORDER — METHYLPREDNISOLONE 4 MG
TABLET, DOSE PACK ORAL
Qty: 21 TABLET | Refills: 0 | Status: SHIPPED | OUTPATIENT
Start: 2024-10-22

## 2024-10-22 RX ORDER — METHYLPREDNISOLONE SODIUM SUCCINATE 125 MG/2ML
125 INJECTION, POWDER, LYOPHILIZED, FOR SOLUTION INTRAMUSCULAR; INTRAVENOUS DAILY
Status: DISCONTINUED | OUTPATIENT
Start: 2024-10-22 | End: 2024-10-23 | Stop reason: HOSPADM

## 2024-10-22 RX ADMIN — TORSEMIDE 60 MG: 20 TABLET ORAL at 10:04

## 2024-10-22 RX ADMIN — METHYLPREDNISOLONE SODIUM SUCCINATE 125 MG: 125 INJECTION INTRAMUSCULAR; INTRAVENOUS at 10:05

## 2024-10-22 RX ADMIN — NEBIVOLOL 10 MG: 5 TABLET ORAL at 10:03

## 2024-10-22 RX ADMIN — DICLOFENAC SODIUM 2 G: 30 GEL TOPICAL at 18:11

## 2024-10-22 RX ADMIN — LEVOTHYROXINE SODIUM 75 MCG: 50 TABLET ORAL at 10:03

## 2024-10-22 RX ADMIN — NIFEDIPINE 60 MG: 60 TABLET, FILM COATED, EXTENDED RELEASE ORAL at 10:12

## 2024-10-22 RX ADMIN — CETIRIZINE HYDROCHLORIDE 10 MG: 10 TABLET ORAL at 10:04

## 2024-10-22 RX ADMIN — Medication 10 ML: at 10:05

## 2024-10-22 RX ADMIN — Medication 10 ML: at 20:36

## 2024-10-22 RX ADMIN — GADOBENATE DIMEGLUMINE 20 ML: 529 INJECTION, SOLUTION INTRAVENOUS at 04:43

## 2024-10-22 RX ADMIN — DICLOFENAC SODIUM 2 G: 30 GEL TOPICAL at 14:13

## 2024-10-22 RX ADMIN — ASPIRIN 325 MG: 325 TABLET, COATED ORAL at 10:04

## 2024-10-22 RX ADMIN — LETROZOLE 2.5 MG: 2.5 TABLET ORAL at 10:04

## 2024-10-22 RX ADMIN — DICLOFENAC SODIUM 2 G: 30 GEL TOPICAL at 20:37

## 2024-10-22 NOTE — CASE MANAGEMENT/SOCIAL WORK
Discharge Planning Assessment  [facility]     Patient Name: Gela Holden  MRN: [id number]  Today's Date: 10/22/2024    Admit Date: 10/21/2024    Plan: Home; follow for PT eval   Discharge Needs Assessment       Row Name 10/22/24 1003       Living Environment    People in Home alone    Current Living Arrangements home    Potentially Unsafe Housing Conditions none    Primary Care Provided by self    Provides Primary Care For no one    Family Caregiver if Needed child(sid), adult    Quality of Family Relationships helpful;involved;supportive    Able to Return to Prior Arrangements yes       Resource/Environmental Concerns    Resource/Environmental Concerns none    Transportation Concerns none       Transition Planning    Patient/Family Anticipates Transition to home    Patient/Family Anticipated Services at Transition none    Transportation Anticipated family or friend will provide       Discharge Needs Assessment    Readmission Within the Last 30 Days no previous admission in last 30 days    Equipment Currently Used at Home shower chair;walker, rolling;cane, straight    Concerns to be Addressed no discharge needs identified;denies needs/concerns at this time    Anticipated Changes Related to Illness none                   Discharge Plan       Row Name 10/22/24 1003       Plan    Plan Home; follow for PT eval    Patient/Family in Agreement with Plan yes    Plan Comments CCP met with patient and patient's daughter at bedside. Patient gave CCP permission to discuss d/c plans with daughter present. CCP role explained and face sheet verified. LISA noted. Patient's PCP is Dr. Live. Patient lives alone, with three steps to the entrance of her home. Patient has grab bars and shower chair present in her bathroom. Patient is independent with her ADLs and does not use DME. Patient has access to a walker and cane if needed. Patient has used [facility]  in the past and denies any SNF. Patient plans to return home  at discharge. CCP discussed home health; patient unsure if it is needed at this time and would like to see how she does with PT at the hospital first. CCP provided patient choice for HH agencies. CCP will follow for PT eval and assist as needed. Madelyn MORALES                  Continued Care and Services - Admitted Since 10/21/2024    No active coordination exists for this encounter.          Demographic Summary       Row Name 10/22/24 1002       General Information    Admission Type observation    Arrived From emergency department    Required Notices Provided Observation Status Notice    Referral Source admission list    Reason for Consult discharge planning    Preferred Language English                   Functional Status       Row Name 10/22/24 1002       Functional Status    Usual Activity Tolerance good    Current Activity Tolerance good       Functional Status, IADL    Medications independent    Meal Preparation independent    Housekeeping independent    Laundry independent    Shopping independent       Mental Status    General Appearance WDL WDL       Mental Status Summary    Recent Changes in Mental Status/Cognitive Functioning no changes                   Psychosocial    No documentation.                  Abuse/Neglect    No documentation.                  Legal    No documentation.                  Substance Abuse    No documentation.                  Patient Forms    No documentation.                     ANDREW Mustafa

## 2024-10-22 NOTE — DISCHARGE PLACEMENT REQUEST
"Gary Holden (69 y.o. Female)       Date of Birth   1955    Social Security Number       Address   [address] [place] [address]    Home Phone   [phone]    MRN   [id number]       Sabianist   Unknown    Marital Status   Single                            Admission Date   10/21/24    Admission Type   Emergency    Admitting Provider   Arley Butt MD    Attending Provider   Sathya Jewell MD    Department, Room/Bed   [facility] OBSERVATION, 130/1       Discharge Date       Discharge Disposition       Discharge Destination                                 Attending Provider: Sathya Jewell MD    Allergies: Penicillins, Sulfa Antibiotics    Isolation: None   Infection: None   Code Status: CPR    Ht: 175.3 cm (69\")   Wt: 125 kg (275 lb)    Admission Cmt: None   Principal Problem: Right foot pain [M79.671]                   Active Insurance as of 10/21/2024       Primary Coverage       Payor Plan Insurance Group Employer/Plan Group    MEDICARE MEDICARE A & B        Payor Plan Address Payor Plan Phone Number Payor Plan Fax Number Effective Dates    [address] [phone]  8/1/2021 - None Entered    [facility] [id number]         Subscriber Name Subscriber Birth Date Member ID       GARY HOLDEN 1955 [id number]               Secondary Coverage       Payor Plan Insurance Group Employer/Plan Group    [facility] [id number]       Payor Plan Address Payor Plan Phone Number Payor Plan Fax Number Effective Dates    [address]   1/1/2024 - None Entered    [facility] [id number]         Subscriber Name Subscriber Birth Date Member ID       GARY HOLDEN 1955 [id number]                     Emergency Contacts        (Rel.) Home Phone Work Phone Mobile Phone    GARY MEDRANO (Daughter) -- -- [phone]                "

## 2024-10-22 NOTE — PLAN OF CARE
Goal Outcome Evaluation:         Pt admitted for intense right foot pain and swelling. No injuries noted. The right ankle is hot to the touch while the rest of the foot is only warm. Swelling is present in the right ankle and right foot. Patient reports the pain to be around a 3/10 while resting and when using it goes up to a 10/10. External catheter placed for comfort measures. RA, VSS, A+Ox4, Assist x 1, unable to ambulate. MRI results pending. Podiatry consult in the morning.

## 2024-10-22 NOTE — PLAN OF CARE
Goal Outcome Evaluation:              Outcome Evaluation: Pt. to stay over night  to manage pain and PT top see pt tomorrow.   VSS.   pt does not have any other questions at this time.

## 2024-10-22 NOTE — CONSULTS
Podiatry Consult Note      Patient: Gela Holden Admit Date: 10/21/2024    Age: 69 y.o.   PCP: Orion Live MD    MRN: [id number]  Room: 130/1        Subjective     Chief Complaint     Chief Complaint   Patient presents with    Foot Pain        HPI     This is a 69-year-old female who presents with significant pain to her right foot and ankle.  This started yesterday morning when she went to step down out of bed and noticed excruciating pain.  She denies any trauma to the foot or ankle.  She came to the emergency department for further evaluation.  X-rays were negative.  Her white blood cell count is normal, her ESR and CRP are elevated with a uric acid elevation as well.  She had had an MRI performed of her right ankle.  Patient states she has received a dose of steroids in her foot pain has improved since that medication has been given.    Past Medical History     Past Medical History:   Diagnosis Date    Anesthesia     DOES NOT LIKE MASK ON FACE    Arthritis     At risk for sleep apnea     STOP BANG 5    Cancer 12/2021    BREAST RT    Cellulitis     Chronic back pain     Depression     Disease of thyroid gland     Exercise intolerance     GERD (gastroesophageal reflux disease)     Hypertension     Knee pain     RONY    Limited joint range of motion     LEFT    Stress incontinence     Varicose veins of left lower extremity     Vitamin D deficiency         Past Surgical History:   Procedure Laterality Date    BREAST BIOPSY Right 12/2021    BREAST LUMPECTOMY Right 02/2022    COLONOSCOPY  2019    TOTAL KNEE ARTHROPLASTY Left 7/16/2024    Procedure: TOTAL KNEE ARTHROPLASTY;  Surgeon: Robert Glover MD;  Location: [facility];  Service: Orthopedics;  Laterality: Left;    TUBAL COAGULATION LAPAROSCOPIC Bilateral     VENTRAL HERNIA REPAIR  03/2015        Allergies   Allergen Reactions    Penicillins Anaphylaxis     RASH, SWELLING OF THROAT, SHORTNESS OF BREATH    Sulfa Antibiotics Diarrhea     Had  body aches        Social History     Tobacco Use   Smoking Status Never   Smokeless Tobacco Never        Objective   Physical Exam    Vitals:    10/22/24 0744   BP: 122/68   Pulse: 54   Resp: 18   Temp: 97.5 °F (36.4 °C)   SpO2: 97%        Dermatology:   Mild edema to the dorsal midfoot.  No open wounds, no erythema present.    Vascular:   DP and PT pulses are palpable    Neurological:  light touch and protective sensation are present to the right foot.     Musckuloskeletal:   No pain to palpation of first MTPJ, no pain to palpation of the anterior ankle joint.  No pain to the Achilles tendon.  There is pain to the dorsal midfoot and dorsal talonavicular joint.     Labs     Lab Results   Component Value Date    SEDRATE 26 10/21/2024        CBC:      Lab 10/22/24  0545 10/21/24  1609   WBC 6.12 9.15   HEMOGLOBIN 11.7* 11.9*   HEMATOCRIT 33.8* 37.3   PLATELETS 202 222   NEUTROS ABS  --  7.46*   IMMATURE GRANS (ABS)  --  0.03   LYMPHS ABS  --  0.80   MONOS ABS  --  0.77   EOS ABS  --  0.07   MCV 80.9 81.8          No results found for this or any previous visit.     MRI Ankle Right With & Without Contrast  Narrative: MRI ANKLE RIGHT W WO CONTRAST-     Date of Exam: 10/22/2024 4:13 AM     Indication: right foot pain, r/o osteomyelitis. Patient woke up with  pain over entire foot. Redness, warmth, and swelling.     Comparison: Right foot radiographs 10/21/2024.     Technique: Multiplanar, multisequence MR imaging of the ankle, hindfoot,  and midfoot was performed before and following the intravenous  administration of 20 mL of MultiHance.     FINDINGS:     SOFT TISSUES: Mild diffuse predominantly subcutaneous soft tissue edema  at the distal lower leg, ankle, and hindfoot and the dorsum of the  partially imaged lateral forefoot. No abnormal soft tissue enhancement.  No cystic or solid soft tissue mass. No nodular or masslike enhancement.  The sinus tarsi is unremarkable. The plantar fascia is unremarkable.     BONES:  Tiny subchondral cystic changes at the medial talar dome, likely  related to overlying chondromalacia. No acute fracture. No concerning  bone marrow lesion or marrow replacing process. No evidence of osseous  coalition.     JOINTS: Nonspecific large tibiotalar joint effusion and moderate  talonavicular joint effusion with associated mild diffuse enhancing  synovial thickening. Small talonavicular and calcaneocuboid joint  effusions with mild diffuse enhancing synovial thickening. Focal 5 mm x  15 mm full-thickness chondral defect at the medial and posterior distal  tibial plafond and talar dome. The articular cartilage in the subtalar  joint spaces is well maintained. The included midfoot is well aligned.  No discrete intra-articular body is identified.     LIGAMENTS: The deep and superficial components of the deltoid ligament  complex are intact.  The anterior and posterior distal tibiofibular  ligaments are intact. The anterior talofibular ligament is intact. The  calcaneofibular ligament is intact. The Lisfranc ligament is intact.     MUSCLES AND TENDONS: The medial flexor tendons, extensor tendons, and  peroneal tendons are intact. Mild thickening and increased signal of the  Achilles tendon, mild tendinopathy. Mild to moderate diffuse muscular  fatty atrophy, likely neuropathic. No myositis.     Impression:    1. Nonspecific large tibiotalar joint effusion and moderate  talonavicular joint effusion with associated mild diffuse enhancing  synovial thickening.  2. Focal full-thickness chondral defects at the posterior medial distal  tibial plafond and talar dome with tiny underlying subchondral cystic  changes at the medial talar dome.  3. Nonspecific small talonavicular and calcaneocuboid joint effusions  with mild diffuse enhancing synovial thickening.  4. Mild Achilles tendinopathy.  5. Mild to moderate diffuse muscular fatty atrophy, likely neuropathic     This report was finalized on 10/22/2024 7:40 AM by  Eric Lowe MD on  Workstation: [id number]          Assessment/Plan       69-year-old female with right foot and ankle capsulitis, synovitis, possible gout    -patient examined and evaluated by myself  -CRP and uric acid is elevated, ESR is normal and WBC is normal.  No concern for infection at this time.  - X-rays reviewed and no fractures or dislocations noted.  MRI also reviewed with several ankle and midfoot joints with synovitis.  - Recommend continued steroids for inflammation and cam boot for ambulation  - Patient can follow-up with me outpatient for further care.  I discussed this with FIONA Gee DPM  [facility]  Office: [phone]

## 2024-10-22 NOTE — DISCHARGE PLACEMENT REQUEST
"Gary Holden (69 y.o. Female)       Date of Birth   1955    Social Security Number       Address   [address] [place] [address]    Home Phone   [phone]    MRN   [id number]       Sabianism   Unknown    Marital Status   Single                            Admission Date   10/21/24    Admission Type   Emergency    Admitting Provider   rAley Butt MD    Attending Provider   Sathya Jewell MD    Department, Room/Bed   [facility] OBSERVATION, 130/1       Discharge Date       Discharge Disposition       Discharge Destination                                 Attending Provider: Sathya Jewell MD    Allergies: Penicillins, Sulfa Antibiotics    Isolation: None   Infection: None   Code Status: CPR    Ht: 175.3 cm (69\")   Wt: 125 kg (275 lb)    Admission Cmt: None   Principal Problem: Right foot pain [M79.671]                   Active Insurance as of 10/21/2024       Primary Coverage       Payor Plan Insurance Group Employer/Plan Group    MEDICARE MEDICARE A & B        Payor Plan Address Payor Plan Phone Number Payor Plan Fax Number Effective Dates    [address] [phone]  8/1/2021 - None Entered    [facility] [id number]         Subscriber Name Subscriber Birth Date Member ID       GARY HOLDEN 1955 [id number]               Secondary Coverage       Payor Plan Insurance Group Employer/Plan Group    [facility] [id number]       Payor Plan Address Payor Plan Phone Number Payor Plan Fax Number Effective Dates    [address]   1/1/2024 - None Entered    [facility] [address]         Subscriber Name Subscriber Birth Date Member ID       GARY HOLDEN 1955 [id number]                     Emergency Contacts        (Rel.) Home Phone Work Phone Mobile Phone    GARY MEDRANO (Daughter) -- -- [phone]                "

## 2024-10-22 NOTE — PROGRESS NOTES
VU CHÁVEZ Attestation Note    I supervised care provided by the midlevel provider.    The KARISSA and I have discussed this patient's history, physical exam, and treatment plan. I have reviewed the documentation and personally had a face to face interaction with the patient  I affirm the documentation and agree with the treatment and plan. I provided a substantive portion of the care of this patient.  I personally performed the physical exam, in its entirety.  My personal findings are documented in below:    History:  69-year-old female admitted to the observation unit for further evaluation of right foot pain and swelling.  Workup in the emergency department notable for elevated CRP and uric acid.    Physical Exam:  General: No acute distress.  HENT: NCAT, PERRL, Nares patent.  Eyes: no scleral icterus.  Neck: trachea midline, no ROM limitations.  CV: Pink warm and well-perfused throughout  Respiratory: No distress or increased work of breathing  Abdomen: soft, no focal tenderness or rigidity  Musculoskeletal: no deformity.  Swelling and tenderness noted to the right midfoot  Neuro: alert, moves all extremities, follows commands.  Skin: warm, dry.    Assessment and Plan:  69-year-old female admitted to observation for further evaluation and management of right foot pain and swelling  - Podiatry consulted  - MRI ankle without evidence of osteomyelitis  - Doppler ultrasound pending  - DEWAYNE pending  - Likely gout

## 2024-10-22 NOTE — PROGRESS NOTES
ED OBSERVATION PROGRESS/DISCHARGE SUMMARY    Date of Admission: 10/21/2024   LOS: 0 days   PCP: Orion Live MD    Final Diagnosis acute gout      Subjective     Hospital Outcome:     Patient is a pleasant afebrile 69-year-old black female admitted to the ED observation unit for right mid foot discomfort.  Pain is atraumatic but yesterday she endorsed it was so severe that she was unable to bear weight.    MRI of her right ankle with and without contrast shows some nonspecific degenerative changes but no acute infectious pathology was appreciated.  Right lower extremity duplex and ankle-brachial indices were normal.    Seen and evaluated by Dr. Galvan with the podiatry service who advises the patient symptomatology is likely consistent with an acute gout flare and recommends walking boot and steroids.  She was given a dose of Solu-Medrol last evening and this morning endorses that her pain is improved without and with rest.    She is status post left total knee arthroplasty 7/16/2024.  Seen by physical therapy team who advised that cam boot present but feels a little bit too tight for good ambulation to be performed.  Plan for further elevation tonight and reeval tomorrow.  They do also recommend home health PT upon discharge.  CCP team aware.    ROS:  General: no fevers, chills  Respiratory: no cough, dyspnea  Cardiovascular: no chest pain, palpitations  Abdomen: No abdominal pain, nausea, vomiting, or diarrhea  Neurologic: No focal weakness    Objective   Physical Exam:  I have reviewed the vital signs.  Temp:  [97.5 °F (36.4 °C)-100.3 °F (37.9 °C)] 97.5 °F (36.4 °C)  Heart Rate:  [54-78] 54  Resp:  [16-20] 18  BP: (124-143)/(64-95) 126/76  General Appearance:    Alert, cooperative, no distress  Head:    Normocephalic, atraumatic  Eyes:    Sclerae anicteric  Neck:   Supple, no mass  Lungs: Clear to auscultation bilaterally, respirations unlabored  Heart: Regular rate and rhythm, S1 and S2 normal, no  murmur, rub or gallop  Abdomen:  Soft, nontender, bowel sounds active, obese  Extremities: No clubbing, cyanosis, or edema to lower extremities, moderate tenderness to palpation overlying the dorsum of patient's right midfoot without erythema or skin breakdown, no crepitus or warmth  Pulses:  2+ and symmetric in distal lower extremities  Skin: No rashes   Neurologic: Oriented x3, Normal strength to extremities    Results Review:    I have reviewed the labs, radiology results and diagnostic studies.    Results from last 7 days   Lab Units 10/22/24  0545   WBC 10*3/mm3 6.12   HEMOGLOBIN g/dL 11.7*   HEMATOCRIT % 33.8*   PLATELETS 10*3/mm3 202     Results from last 7 days   Lab Units 10/22/24  0545 10/21/24  1609   SODIUM mmol/L 136 141   POTASSIUM mmol/L 4.3 3.5   CHLORIDE mmol/L 102 101   CO2 mmol/L 21.6* 27.0   BUN mg/dL 17 14   CREATININE mg/dL 1.19* 1.25*   CALCIUM mg/dL 9.4 9.7   BILIRUBIN mg/dL 0.6 0.8   ALK PHOS U/L 109 126*   ALT (SGPT) U/L 14 17   AST (SGOT) U/L 17 16   GLUCOSE mg/dL 161* 98     Imaging Results (Last 24 Hours)       Procedure Component Value Units Date/Time    MRI Ankle Right With & Without Contrast [[id number]] Collected: 10/22/24 0729     Updated: 10/22/24 0744    Narrative:      MRI ANKLE RIGHT W WO CONTRAST-     Date of Exam: 10/22/2024 4:13 AM     Indication: right foot pain, r/o osteomyelitis. Patient woke up with  pain over entire foot. Redness, warmth, and swelling.     Comparison: Right foot radiographs 10/21/2024.     Technique: Multiplanar, multisequence MR imaging of the ankle, hindfoot,  and midfoot was performed before and following the intravenous  administration of 20 mL of MultiHance.     FINDINGS:     SOFT TISSUES: Mild diffuse predominantly subcutaneous soft tissue edema  at the distal lower leg, ankle, and hindfoot and the dorsum of the  partially imaged lateral forefoot. No abnormal soft tissue enhancement.  No cystic or solid soft tissue mass. No nodular or masslike  enhancement.  The sinus tarsi is unremarkable. The plantar fascia is unremarkable.     BONES: Tiny subchondral cystic changes at the medial talar dome, likely  related to overlying chondromalacia. No acute fracture. No concerning  bone marrow lesion or marrow replacing process. No evidence of osseous  coalition.     JOINTS: Nonspecific large tibiotalar joint effusion and moderate  talonavicular joint effusion with associated mild diffuse enhancing  synovial thickening. Small talonavicular and calcaneocuboid joint  effusions with mild diffuse enhancing synovial thickening. Focal 5 mm x  15 mm full-thickness chondral defect at the medial and posterior distal  tibial plafond and talar dome. The articular cartilage in the subtalar  joint spaces is well maintained. The included midfoot is well aligned.  No discrete intra-articular body is identified.     LIGAMENTS: The deep and superficial components of the deltoid ligament  complex are intact.  The anterior and posterior distal tibiofibular  ligaments are intact. The anterior talofibular ligament is intact. The  calcaneofibular ligament is intact. The Lisfranc ligament is intact.     MUSCLES AND TENDONS: The medial flexor tendons, extensor tendons, and  peroneal tendons are intact. Mild thickening and increased signal of the  Achilles tendon, mild tendinopathy. Mild to moderate diffuse muscular  fatty atrophy, likely neuropathic. No myositis.       Impression:         1. Nonspecific large tibiotalar joint effusion and moderate  talonavicular joint effusion with associated mild diffuse enhancing  synovial thickening.  2. Focal full-thickness chondral defects at the posterior medial distal  tibial plafond and talar dome with tiny underlying subchondral cystic  changes at the medial talar dome.  3. Nonspecific small talonavicular and calcaneocuboid joint effusions  with mild diffuse enhancing synovial thickening.  4. Mild Achilles tendinopathy.  5. Mild to moderate diffuse  muscular fatty atrophy, likely neuropathic     This report was finalized on 10/22/2024 7:40 AM by Eric Lowe MD on  Workstation: [id number]       XR Foot 3+ View Right [[id number]] Collected: 10/21/24 1549     Updated: 10/21/24 1556    Narrative:      XR FOOT 3+ VW RIGHT-     INDICATIONS: Pain        TECHNIQUE: 3 VIEWS OF THE RIGHT FOOT     COMPARISON: None available     FINDINGS:     No acute fracture, erosion, or dislocation is identified. Increased  hallux valgus angulation is apparent, nonweightbearing exam. Small  calcaneal spurring and anterior tibial spurring are noted.  Follow-up/further evaluation can be obtained as indications persist.       Impression:         As described.           This report was finalized on 10/21/2024 3:53 PM by Dr. Jorge Alberto Pino M.D on Workstation: [id number]               I have reviewed the medications.  ---------------------------------------------------------------------------------------------  Assessment & Plan   Assessment/Problem List    Right foot pain      Plan:    Right foot pain  -Plain films show no acute fracture, erosion or dislocation  -Uric acid 9.8, CRP 1.05, sed rate normal 26  -MRI of the right foot negative for acute findings  -Venous Doppler ultrasound of the right lower extremity ordered  -Bilateral ABIs negative  -Podiatry consulted, advises exam consistent with gout, recommends walking boot and steroids  -Physical therapy consult, plan for reeval in a.m.     Hypertension  -Continue nebivolol, nifedipine  -Monitor vital signs every 4 hours     Hypothyroidism  -Continue levothyroxine     Breast cancer, right  -Status right breast lumpectomy February 2022  -Continue letrozole    Disposition: I anticipate patient to be discharged home tomorrow    This note will serve as a progress note    Marlen Sebastian, APRN 10/22/24 07:45 EDT    I have worn appropriate PPE during this patient encounter, sanitized my hands both with entering and exiting patient's  room.      52 minutes has been spent by [facility] providers in the care of this patient while under observation status

## 2024-10-22 NOTE — CASE MANAGEMENT/SOCIAL WORK
Continued Stay Note  [facility]     Patient Name: Gela Holden  MRN: [id number]  Today's Date: 10/22/2024    Admit Date: 10/21/2024    Plan: Home with [facility]    Discharge Plan       Row Name 10/22/24 1453       Plan    Plan Home with [facility]     Plan Comments CCP met with patient at bedside. Patient states she worked with PT and would like home health. Patient prefers to use [facility]  again. Referral placed in [facility]. Madelyn MORALES                   Discharge Codes    No documentation.                       ANDREW Mustafa

## 2024-10-22 NOTE — PLAN OF CARE
Goal Outcome Evaluation:  Plan of Care Reviewed With: patient, child        Progress: improving     Pt is a 69 y.o. female with h/o HTN, OA, breast CA and L TKA 7/2024 who was admitted to [facility] on 10/21/2024 d/t R foot pain and inability to weight bear on it. Imaging (-) for fxs, but (+) effusion and achilles tendinopathy. LE dopplar (-). Per Podiatry: rec CAM boot for ambulation. Patient is (I) at baseline, without use of an AD, owns a RW and lives alone in a house w/ 3 PATRICK; dtr can check on her. Today, pt performed bed mobility with SBA, required CGA for transfers with a RW, and ambulated 2 R side steps w/ RW, CAM boot and CG/Nirav, then after a seated rest break performed 1 step fwd/bwd w/o CAM boot, w/ RW and demo R knee buckling d/t increased pain at R ankle/foot. Pt required Nirav to correct/maintain her balance and safely lower back to sitting EOB. Once back in bed pts RLE was elevated and an ice pack applied. Pts CAM boot fits at her foot, but is too tight at her lower calf/ankle. Pt requesting to give time for swelling to decrease prior to ordering new/larger boot. Pt also requesting to stay an additional night for pain control and for swelling to further decrease; RN and APRN aware. Pt may benefit from skilled PT services acutely to address their impaired strength, balance and endurance, as well as, improve their level of independence prior to discharge. DOROTA w/ RN. Rec HH and family assist upon DC.      Anticipated Discharge Disposition (PT): home with home health, home with assist

## 2024-10-22 NOTE — H&P
[facility]   HISTORY AND PHYSICAL    Patient Name: Gela Holden  : 1955  MRN: [id number]  Primary Care Physician:  Orion Live MD  Date of admission: 10/21/2024    Subjective   Subjective     Chief Complaint:   Chief Complaint   Patient presents with    Foot Pain         HPI:    Gela Holden is a 69 y.o. female with a past medical history including but not limited to osteoarthritis, hypertension, hypothyroidism, and breast cancer status postlumpectomy who presents to [facility] ER with right foot pain.  Patient states yesterday she noticed her foot felt tight but she was having no difficulty walking on it.  She states when she woke up today the pain was severe.  She reports pain in both sides of her ankle as well as over the top of her foot and up into shin.  She reports that the foot feels swollen.  She denies any numbness or tingling.  She states she rolled out of the bed over 2 weeks ago and landed on her left side but has no known trauma to the foot that could have preceded this pain.  She denies any fevers or chills.  Denies cough.  She reports she has had a bit of a sore throat upon wakening but improves in the morning.  She denies any chest pain or shortness of breath.  Reports some nausea today after taking tramadol on an empty stomach as well as 1 episode of nonbloody diarrhea.  Denies pain with urination or change in frequency.    Review of Systems   All systems were reviewed and negative except for: What is mentioned above in HPI.    Personal History     Past Medical History:   Diagnosis Date    Anesthesia     DOES NOT LIKE MASK ON FACE    Arthritis     At risk for sleep apnea     STOP BANG 5    Cancer 2021    BREAST RT    Cellulitis     Chronic back pain     Depression     Disease of thyroid gland     Exercise intolerance     GERD (gastroesophageal reflux disease)     Hypertension     Knee pain     RONY    Limited joint range of motion     LEFT    Stress  incontinence     Varicose veins of left lower extremity     Vitamin D deficiency        Past Surgical History:   Procedure Laterality Date    BREAST BIOPSY Right 12/2021    BREAST LUMPECTOMY Right 02/2022    COLONOSCOPY  2019    TOTAL KNEE ARTHROPLASTY Left 7/16/2024    Procedure: TOTAL KNEE ARTHROPLASTY;  Surgeon: Robert Glover MD;  Location: [facility];  Service: Orthopedics;  Laterality: Left;    TUBAL COAGULATION LAPAROSCOPIC Bilateral     VENTRAL HERNIA REPAIR  03/2015       Family History: family history is not on file. Otherwise pertinent FHx was reviewed and not pertinent to current issue.    Social History:  reports that she has never smoked. She has never used smokeless tobacco. She reports that she does not currently use alcohol. She reports that she does not use drugs.    Home Medications:  Cetirizine HCl, NIFEdipine XL, aspirin, budesonide-formoterol, docusate sodium, escitalopram, famotidine, letrozole, levothyroxine, nebivolol, oxyCODONE-acetaminophen, torsemide, and vitamin D    Allergies:  Allergies   Allergen Reactions    Penicillins Anaphylaxis     RASH, SWELLING OF THROAT, SHORTNESS OF BREATH    Sulfa Antibiotics Diarrhea     Had body aches       Objective   Objective     Vitals:   Temp:  [100.3 °F (37.9 °C)] 100.3 °F (37.9 °C)  Heart Rate:  [56-78] 56  Resp:  [16-18] 16  BP: (138-143)/(72-95) 143/72  Physical Exam    Constitutional: 69-year-old female in no acute distress on room air   Eyes: PERRLA, sclerae anicteric, no conjunctival injection   HENT: NCAT, mucous membranes moist   Neck: Supple, no thyromegaly, no lymphadenopathy, trachea midline   Respiratory: Clear to auscultation bilaterally, nonlabored respirations    Cardiovascular: RRR, no murmurs, rubs, or gallops, palpable pedal pulses bilaterally   Gastrointestinal: Positive bowel sounds, soft, nontender, nondistended   Musculoskeletal: Right foot swollen compared to left, no pitting edema, diffuse tenderness along the anterior  shin, medial and lateral ankle and dorsal aspect of the right foot.  2+ PT DP pulses bilaterally, sensation intact   Psychiatric: Appropriate affect, cooperative   Neurologic: Oriented x 3, strength symmetric in all extremities, Cranial Nerves grossly intact to confrontation, speech clear   Skin: No rashes     Result Review    Result Review:  I have personally reviewed the results from the time of this admission to 10/21/2024 20:40 EDT and agree with these findings:  [x]  Laboratory list / accordion  []  Microbiology  [x]  Radiology  []  EKG/Telemetry   []  Cardiology/Vascular   []  Pathology  [x]  Old records  []  Other:  Most notable findings include:     XR Foot 3+ View Right    Result Date: 10/21/2024  XR FOOT 3+ VW RIGHT-  INDICATIONS: Pain   TECHNIQUE: 3 VIEWS OF THE RIGHT FOOT  COMPARISON: None available  FINDINGS:  No acute fracture, erosion, or dislocation is identified. Increased hallux valgus angulation is apparent, nonweightbearing exam. Small calcaneal spurring and anterior tibial spurring are noted. Follow-up/further evaluation can be obtained as indications persist.       As described.    This report was finalized on 10/21/2024 3:53 PM by Dr. Jorge Alberto Pino M.D on Workstation: [id number]          Assessment & Plan   Assessment / Plan     Brief Patient Summary:  Gela Holden is a 69 y.o. female who is being admitted the observation unit for further evaluation of right foot pain and swelling.  In the ED, plain films of the foot shows no acute fracture, erosion or dislocation.  Labs noted elevated uric acid at 9.8, CRP 1.05, sed rate normal at 26.  Patient's creatinine appears at baseline at 1.25.  Plan for further evaluation with MRI of the right foot, Doppler ultrasound of the right lower extremity as well as ABIs.  Podiatry consulted.    Active Hospital Problems:  Active Hospital Problems    Diagnosis     **Right foot pain      Plan:     Right foot pain  -Plain films show no acute fracture,  erosion or dislocation  -Uric acid 9.8, CRP 1.05, sed rate normal 26  -MRI of the right foot ordered  -Venous Doppler ultrasound of the right lower extremity ordered  -Bilateral ABIs ordered  -Podiatry consulted  -Analgesics as needed    Hypertension  -Continue nebivolol, nifedipine  -Monitor vital signs every 4 hours    Hypothyroidism  -Continue levothyroxine    Breast cancer, right  -Status right breast lumpectomy February 2022  -Continue letrozole      VTE Prophylaxis:  Mechanical VTE prophylaxis orders are present.        CODE STATUS:    Code Status (Patient has no pulse and is not breathing): CPR (Attempt to Resuscitate)  Medical Interventions (Patient has pulse or is breathing): Full Support    Admission Status:  I believe this patient meets observation status.    75 minutes have been spent by [facility] providers in the care of this patient while under observation status.      Appropriate PPE worn during patient encounter.  Hand hygeine performed before and after seeing the patient.      Electronically signed by Avani Quintero PA-C, 10/21/24, 8:39 PM EDT.

## 2024-10-23 VITALS
HEIGHT: 69 IN | DIASTOLIC BLOOD PRESSURE: 68 MMHG | SYSTOLIC BLOOD PRESSURE: 123 MMHG | BODY MASS INDEX: 40.73 KG/M2 | RESPIRATION RATE: 18 BRPM | HEART RATE: 62 BPM | OXYGEN SATURATION: 95 % | TEMPERATURE: 98.4 F | WEIGHT: 275 LBS

## 2024-10-23 LAB
ALBUMIN SERPL-MCNC: 3.4 G/DL (ref 3.5–5.2)
ALBUMIN/GLOB SERPL: 0.9 G/DL
ALP SERPL-CCNC: 100 U/L (ref 39–117)
ALT SERPL W P-5'-P-CCNC: 13 U/L (ref 1–33)
ANION GAP SERPL CALCULATED.3IONS-SCNC: 12.6 MMOL/L (ref 5–15)
AST SERPL-CCNC: 12 U/L (ref 1–32)
BASOPHILS # BLD AUTO: 0.01 10*3/MM3 (ref 0–0.2)
BASOPHILS NFR BLD AUTO: 0.1 % (ref 0–1.5)
BILIRUB SERPL-MCNC: 0.3 MG/DL (ref 0–1.2)
BUN SERPL-MCNC: 23 MG/DL (ref 8–23)
BUN/CREAT SERPL: 18.3 (ref 7–25)
CALCIUM SPEC-SCNC: 8.9 MG/DL (ref 8.6–10.5)
CHLORIDE SERPL-SCNC: 103 MMOL/L (ref 98–107)
CO2 SERPL-SCNC: 23.4 MMOL/L (ref 22–29)
CREAT SERPL-MCNC: 1.26 MG/DL (ref 0.57–1)
DEPRECATED RDW RBC AUTO: 47.1 FL (ref 37–54)
EGFRCR SERPLBLD CKD-EPI 2021: 46.6 ML/MIN/1.73
EOSINOPHIL # BLD AUTO: 0 10*3/MM3 (ref 0–0.4)
EOSINOPHIL NFR BLD AUTO: 0 % (ref 0.3–6.2)
ERYTHROCYTE [DISTWIDTH] IN BLOOD BY AUTOMATED COUNT: 15.7 % (ref 12.3–15.4)
GLOBULIN UR ELPH-MCNC: 3.6 GM/DL
GLUCOSE SERPL-MCNC: 136 MG/DL (ref 65–99)
HCT VFR BLD AUTO: 35.2 % (ref 34–46.6)
HGB BLD-MCNC: 11.1 G/DL (ref 12–15.9)
IMM GRANULOCYTES # BLD AUTO: 0.04 10*3/MM3 (ref 0–0.05)
IMM GRANULOCYTES NFR BLD AUTO: 0.3 % (ref 0–0.5)
LYMPHOCYTES # BLD AUTO: 0.73 10*3/MM3 (ref 0.7–3.1)
LYMPHOCYTES NFR BLD AUTO: 6.3 % (ref 19.6–45.3)
MCH RBC QN AUTO: 25.8 PG (ref 26.6–33)
MCHC RBC AUTO-ENTMCNC: 31.5 G/DL (ref 31.5–35.7)
MCV RBC AUTO: 81.7 FL (ref 79–97)
MONOCYTES # BLD AUTO: 0.65 10*3/MM3 (ref 0.1–0.9)
MONOCYTES NFR BLD AUTO: 5.6 % (ref 5–12)
NEUTROPHILS NFR BLD AUTO: 10.15 10*3/MM3 (ref 1.7–7)
NEUTROPHILS NFR BLD AUTO: 87.7 % (ref 42.7–76)
NRBC BLD AUTO-RTO: 0 /100 WBC (ref 0–0.2)
PLATELET # BLD AUTO: 226 10*3/MM3 (ref 140–450)
PMV BLD AUTO: 10.5 FL (ref 6–12)
POTASSIUM SERPL-SCNC: 3.7 MMOL/L (ref 3.5–5.2)
PROT SERPL-MCNC: 7 G/DL (ref 6–8.5)
RBC # BLD AUTO: 4.31 10*6/MM3 (ref 3.77–5.28)
SODIUM SERPL-SCNC: 139 MMOL/L (ref 136–145)
WBC NRBC COR # BLD AUTO: 11.58 10*3/MM3 (ref 3.4–10.8)

## 2024-10-23 PROCEDURE — G0378 HOSPITAL OBSERVATION PER HR: HCPCS

## 2024-10-23 PROCEDURE — 94640 AIRWAY INHALATION TREATMENT: CPT

## 2024-10-23 PROCEDURE — 80053 COMPREHEN METABOLIC PANEL: CPT | Performed by: NURSE PRACTITIONER

## 2024-10-23 PROCEDURE — 94664 DEMO&/EVAL PT USE INHALER: CPT

## 2024-10-23 PROCEDURE — 94799 UNLISTED PULMONARY SVC/PX: CPT

## 2024-10-23 PROCEDURE — 94760 N-INVAS EAR/PLS OXIMETRY 1: CPT

## 2024-10-23 PROCEDURE — 96376 TX/PRO/DX INJ SAME DRUG ADON: CPT

## 2024-10-23 PROCEDURE — 85025 COMPLETE CBC W/AUTO DIFF WBC: CPT | Performed by: NURSE PRACTITIONER

## 2024-10-23 PROCEDURE — 25010000002 METHYLPREDNISOLONE PER 125 MG: Performed by: NURSE PRACTITIONER

## 2024-10-23 PROCEDURE — 97110 THERAPEUTIC EXERCISES: CPT

## 2024-10-23 PROCEDURE — 94761 N-INVAS EAR/PLS OXIMETRY MLT: CPT

## 2024-10-23 RX ADMIN — NIFEDIPINE 60 MG: 60 TABLET, FILM COATED, EXTENDED RELEASE ORAL at 09:35

## 2024-10-23 RX ADMIN — NEBIVOLOL 10 MG: 5 TABLET ORAL at 09:35

## 2024-10-23 RX ADMIN — TORSEMIDE 60 MG: 20 TABLET ORAL at 10:29

## 2024-10-23 RX ADMIN — DICLOFENAC SODIUM 2 G: 30 GEL TOPICAL at 09:36

## 2024-10-23 RX ADMIN — Medication 10 ML: at 09:36

## 2024-10-23 RX ADMIN — LEVOTHYROXINE SODIUM 75 MCG: 50 TABLET ORAL at 09:34

## 2024-10-23 RX ADMIN — METHYLPREDNISOLONE SODIUM SUCCINATE 125 MG: 125 INJECTION INTRAMUSCULAR; INTRAVENOUS at 09:37

## 2024-10-23 RX ADMIN — LETROZOLE 2.5 MG: 2.5 TABLET ORAL at 09:35

## 2024-10-23 RX ADMIN — BUDESONIDE AND FORMOTEROL FUMARATE DIHYDRATE 1 PUFF: 160; 4.5 AEROSOL RESPIRATORY (INHALATION) at 07:58

## 2024-10-23 NOTE — DISCHARGE SUMMARY
ED OBSERVATION PROGRESS/DISCHARGE SUMMARY    Date of Admission: 10/21/2024   LOS: 0 days   PCP: Orion Live MD      Subjective:  Feeling better this AM    Hospital Outcome:   Patient is a pleasant afebrile 69-year-old black female admitted to the ED observation unit for right mid foot discomfort.  Pain is atraumatic but yesterday she endorsed it was so severe that she was unable to bear weight.     10/22:  MRI of her right ankle with and without contrast shows some nonspecific degenerative changes but no acute infectious pathology was appreciated.  Right lower extremity duplex and ankle-brachial indices were normal.     Seen and evaluated by Dr. Galvan with the podiatry service who advises the patient symptomatology is likely consistent with an acute gout flare and recommends walking boot and steroids.  She was given a dose of Solu-Medrol last evening and this morning endorses that her pain is improved without and with rest.     She is status post left total knee arthroplasty 7/16/2024.  Seen by physical therapy team who advised that cam boot present but feels a little bit too tight for good ambulation to be performed.      10/23:   Plan for further therapy evaluation today.  They do also recommend home health PT upon discharge.  CCP team has been following    After ambulating with PT they advise she has made good progress today. Patient agreeable to discharge home and outpatient follow up     ROS:  General: no fevers, chills  Respiratory: no cough, dyspnea  Cardiovascular: no chest pain, palpitations  Abdomen: No abdominal pain, nausea, vomiting, or diarrhea  Neurologic: No focal weakness    Objective   Physical Exam:  I have reviewed the vital signs.  Temp:  [97.5 °F (36.4 °C)-98.3 °F (36.8 °C)] 98 °F (36.7 °C)  Heart Rate:  [54-67] 57  Resp:  [18-19] 18  BP: ()/(54-91) 104/58  General Appearance:    Alert, cooperative, no distress  Head:    Normocephalic, atraumatic  Eyes:    Sclerae  anicteric  Neck:   Supple, no mass  Lungs: Clear to auscultation bilaterally, respirations unlabored  Heart: Regular rate and rhythm, S1 and S2 normal, no murmur, rub or gallop  Abdomen:  Soft, nontender, bowel sounds active, obese  Extremities: No clubbing, cyanosis, or edema to lower extremities, right midfoot tenderness, no skin breakdown or erythema today  Pulses:  2+ and symmetric in distal lower extremities  Skin: No rashes   Neurologic: Oriented x3, Normal strength to extremities    Results Review:    I have reviewed the labs, radiology results and diagnostic studies.    Results from last 7 days   Lab Units 10/23/24  0338   WBC 10*3/mm3 11.58*   HEMOGLOBIN g/dL 11.1*   HEMATOCRIT % 35.2   PLATELETS 10*3/mm3 226     Results from last 7 days   Lab Units 10/23/24  0338 10/22/24  0545 10/21/24  1609   SODIUM mmol/L 139 136 141   POTASSIUM mmol/L 3.7 4.3 3.5   CHLORIDE mmol/L 103 102 101   CO2 mmol/L 23.4 21.6* 27.0   BUN mg/dL 23 17 14   CREATININE mg/dL 1.26* 1.19* 1.25*   CALCIUM mg/dL 8.9 9.4 9.7   BILIRUBIN mg/dL 0.3 0.6 0.8   ALK PHOS U/L 100 109 126*   ALT (SGPT) U/L 13 14 17   AST (SGOT) U/L 12 17 16   GLUCOSE mg/dL 136* 161* 98     Imaging Results (Last 24 Hours)       Procedure Component Value Units Date/Time    MRI Ankle Right With & Without Contrast [[id number]] Collected: 10/22/24 0729     Updated: 10/22/24 0744    Narrative:      MRI ANKLE RIGHT W WO CONTRAST-     Date of Exam: 10/22/2024 4:13 AM     Indication: right foot pain, r/o osteomyelitis. Patient woke up with  pain over entire foot. Redness, warmth, and swelling.     Comparison: Right foot radiographs 10/21/2024.     Technique: Multiplanar, multisequence MR imaging of the ankle, hindfoot,  and midfoot was performed before and following the intravenous  administration of 20 mL of MultiHance.     FINDINGS:     SOFT TISSUES: Mild diffuse predominantly subcutaneous soft tissue edema  at the distal lower leg, ankle, and hindfoot and the dorsum of  the  partially imaged lateral forefoot. No abnormal soft tissue enhancement.  No cystic or solid soft tissue mass. No nodular or masslike enhancement.  The sinus tarsi is unremarkable. The plantar fascia is unremarkable.     BONES: Tiny subchondral cystic changes at the medial talar dome, likely  related to overlying chondromalacia. No acute fracture. No concerning  bone marrow lesion or marrow replacing process. No evidence of osseous  coalition.     JOINTS: Nonspecific large tibiotalar joint effusion and moderate  talonavicular joint effusion with associated mild diffuse enhancing  synovial thickening. Small talonavicular and calcaneocuboid joint  effusions with mild diffuse enhancing synovial thickening. Focal 5 mm x  15 mm full-thickness chondral defect at the medial and posterior distal  tibial plafond and talar dome. The articular cartilage in the subtalar  joint spaces is well maintained. The included midfoot is well aligned.  No discrete intra-articular body is identified.     LIGAMENTS: The deep and superficial components of the deltoid ligament  complex are intact.  The anterior and posterior distal tibiofibular  ligaments are intact. The anterior talofibular ligament is intact. The  calcaneofibular ligament is intact. The Lisfranc ligament is intact.     MUSCLES AND TENDONS: The medial flexor tendons, extensor tendons, and  peroneal tendons are intact. Mild thickening and increased signal of the  Achilles tendon, mild tendinopathy. Mild to moderate diffuse muscular  fatty atrophy, likely neuropathic. No myositis.       Impression:         1. Nonspecific large tibiotalar joint effusion and moderate  talonavicular joint effusion with associated mild diffuse enhancing  synovial thickening.  2. Focal full-thickness chondral defects at the posterior medial distal  tibial plafond and talar dome with tiny underlying subchondral cystic  changes at the medial talar dome.  3. Nonspecific small talonavicular and  calcaneocuboid joint effusions  with mild diffuse enhancing synovial thickening.  4. Mild Achilles tendinopathy.  5. Mild to moderate diffuse muscular fatty atrophy, likely neuropathic     This report was finalized on 10/22/2024 7:40 AM by Eric Lowe MD on  Workstation: [id number]               I have reviewed the medications.  ---------------------------------------------------------------------------------------------  Assessment & Plan   Assessment/Problem List    Right foot pain    Acute gout      Plan:  Right foot pain  -Plain films show no acute fracture, erosion or dislocation  -Uric acid 9.8, CRP 1.05, sed rate normal 26  -MRI of the RLE negative for acute findings  -Venous Doppler ultrasound of the right lower extremity no evidence of DVT  -Bilateral ABIs negative  -Podiatry consulted, advises exam consistent with gout, recommends walking boot and steroids  -Physical therapy consult, clear for discharge home     Hypertension  -Continue nebivolol, nifedipine     Hypothyroidism  -Continue levothyroxine     Breast cancer, right  -Status right breast lumpectomy February 2022  -Continue letrozole    Disposition: Home    Follow-up after Discharge: PCP & podiatry    This note will serve as a discharge summary      43 minutes have been spent by [facility] providers in the care of this patient while under observation status.    Appropriate PPE worn during patient encounter.  Hand hygeine performed before and after seeing the patient.      Avani Quintero PA-C 10/23/24 05:55 EDT

## 2024-10-23 NOTE — PROGRESS NOTES
MD ATTESTATION NOTE    The KARISSA and I have discussed this patient's history, physical exam, and treatment plan.  I have reviewed the documentation and personally had a face to face interaction with the patient. I affirm the documentation and agree with the treatment and plan.  The attached note describes my personal findings.      I provided a substantive portion of the care of the patient.  I personally performed the physical exam in its entirety, and below are my findings.       Brief HPI: Patient mated with foot pain.  No redness no swelling no fevers or chills.  Patient did fine overnight.    PHYSICAL EXAM  ED Triage Vitals   Temp Heart Rate Resp BP SpO2   10/21/24 1510 10/21/24 1510 10/21/24 1510 10/21/24 1552 10/21/24 1510   100.3 °F (37.9 °C) 77 16 138/95 98 %      Temp src Heart Rate Source Patient Position BP Location FiO2 (%)   10/21/24 2100 10/21/24 1552 10/21/24 1552 10/21/24 1552 --   Oral Monitor Sitting Left arm          GENERAL: no acute distress  HENT: nares patent  EYES: no scleral icterus  CV: regular rhythm, normal rate  RESPIRATORY: normal effort  ABDOMEN: soft  MUSCULOSKELETAL: no deformity  NEURO: alert, moves all extremities, follows commands  PSYCH:  calm, cooperative  SKIN: warm, dry    Vital signs and nursing notes reviewed.        Plan: Podiatry been consulted.  PT consulted.  Uric acid elevated.  Anticipate discharge

## 2024-10-23 NOTE — PLAN OF CARE
Goal Outcome Evaluation:  Plan of Care Reviewed With: patient        Progress: improving  Outcome Evaluation: Pt did well with mobility today, able to walk independently 150 ft without an assistive device, pt is WBAT in  CAM boot on R, she is safe to return home    Anticipated Discharge Disposition (PT): home

## 2024-10-23 NOTE — THERAPY TREATMENT NOTE
Patient Name: Gela Holden  : 1955    MRN: [id number]                              Today's Date: 10/23/2024       Admit Date: 10/21/2024    Visit Dx:     ICD-10-CM ICD-9-CM   1. Acute foot pain, right  M79.671 729.5   2. Elevated uric acid in blood  E79.0 790.6   3. Intractable pain  R52 780.96   4. Acute gout of right foot, unspecified cause  M10.9 274.01   5. Primary osteoarthritis of left knee  M17.12 715.16     Patient Active Problem List   Diagnosis    Primary osteoarthritis of left knee    Right foot pain    Acute gout     Past Medical History:   Diagnosis Date    Anesthesia     DOES NOT LIKE MASK ON FACE    Arthritis     At risk for sleep apnea     STOP BANG 5    Cancer 2021    BREAST RT    Cellulitis     Chronic back pain     Depression     Disease of thyroid gland     Exercise intolerance     GERD (gastroesophageal reflux disease)     Hypertension     Knee pain     RONY    Limited joint range of motion     LEFT    Stress incontinence     Varicose veins of left lower extremity     Vitamin D deficiency      Past Surgical History:   Procedure Laterality Date    BREAST BIOPSY Right 2021    BREAST LUMPECTOMY Right 2022    COLONOSCOPY  2019    TOTAL KNEE ARTHROPLASTY Left 2024    Procedure: TOTAL KNEE ARTHROPLASTY;  Surgeon: Robert Glover MD;  [place] [facility] [place]  Service: [place]opedics;  Laterality: Left;    TUBAL COAGULATION LAPAROSCOPIC Bilateral     VENTRAL HERNIA REPAIR  2015      General Information       Row Name 10/23/24 1104          Physical Therapy Time and Intention    Document Type therapy note (daily note)  -PC     Mode of Treatment physical therapy  -PC               User Key  (r) = Recorded By, (t) = Taken By, (c) = Cosigned By      Initials Name Provider Type    PC Dee Chang PT Physical Therapist                   Mobility       Row Name 10/23/24 1105          Bed Mobility    Supine-Sit [place] (Bed Mobility) independent  -PC     Sit-Supine  [place] (Bed Mobility) independent  -PC       Row Name 10/23/24 1105          Sit-Stand Transfer    Sit-Stand [place] (Transfers) independent  -PC       Row Name 10/23/24 1105          Gait/Stairs (Locomotion)    [place] Level (Gait) independent  -PC     Distance in Feet (Gait) 150  -PC     Deviations/Abnormal Patterns (Gait) antalgic  -PC     Comment, (Gait/Stairs) pt able to walk with R CAM boot in place, WBAT, no assistive device, antalgic gait, no loss of balance  -PC       Row Name 10/23/24 1105          Mobility    Right Lower Extremity (Weight-bearing Status) weight-bearing as tolerated (WBAT)  boot  -PC               User Key  (r) = Recorded By, (t) = Taken By, (c) = Cosigned By      Initials Name Provider Type    PC Dee Chang, PT Physical Therapist                   Obj/Interventions    No documentation.                  Goals/Plan    No documentation.                  Clinical Impression       Row Name 10/23/24 1107          Pain    Pain Location ankle;foot  -PC     Pain Side/Orientation right  -PC     Response to Pain Interventions activity participation with tolerable pain  -PC       Row Name 10/23/24 1107          Plan of Care Review    Plan of Care Reviewed With patient  -PC     Progress improving  -PC     Outcome Evaluation Pt did well with mobility today, able to walk independently 150 ft without an assistive device, pt is WBAT in  CAM boot on R, she is safe to return home  -PC       Row Name 10/23/24 1107          Positioning and Restraints    Pre-Treatment Position in bed  -PC     Post Treatment Position bed  -PC     In Bed supine;call light within reach;encouraged to call for assist  -PC               User Key  (r) = Recorded By, (t) = Taken By, (c) = Cosigned By      Initials Name Provider Type    PC Dee Chang, PT Physical Therapist                   Outcome Measures       Row Name 10/23/24 1108 10/23/24 0400       How much help from another person do you currently  need...    Turning from your back to your side while in flat bed without using bedrails? 4  -PC 4  -AC    Moving from lying on back to sitting on the side of a flat bed without bedrails? 4  -PC 3  -AC    Moving to and from a bed to a chair (including a wheelchair)? 4  -PC 2  -AC    Standing up from a chair using your arms (e.g., wheelchair, bedside chair)? 4  -PC 3  -AC    Climbing 3-5 steps with a railing? 4  -PC 2  -AC    To walk in hospital room? 4  -PC 2  -AC    AM-PAC 6 Clicks Score (PT) 24  -PC 16  -AC              User Key  (r) = Recorded By, (t) = Taken By, (c) = Cosigned By      Initials Name Provider Type    PC Dee Chang, PT Physical Therapist    AC Clarisse Cm, RN Registered Nurse                                 Physical Therapy Education       Title: PT OT SLP Therapies (Done)       Topic: Physical Therapy (Done)       Point: Mobility training (Done)       Learning Progress Summary            Patient Acceptance, E,TB,D, VU by  at 10/22/2024 1416                      Point: Home exercise program (Done)       Learning Progress Summary            Patient Acceptance, E,TB,D, VU by  at 10/22/2024 1416                      Point: Body mechanics (Done)       Learning Progress Summary            Patient Acceptance, E,TB,D, VU by  at 10/22/2024 1416                      Point: Precautions (Done)       Learning Progress Summary            Patient Acceptance, E,TB,D, VU by  at 10/22/2024 1416                                      User Key       Initials Effective Dates Name Provider Type Discipline     05/24/22 -  Jade Gregory, PT Physical Therapist PT                  PT Recommendation and Plan     Progress: improving  Outcome Evaluation: Pt did well with mobility today, able to walk independently 150 ft without an assistive device, pt is WBAT in  CAM boot on R, she is safe to return home     Time Calculation:         PT Charges       Row Name 10/23/24 5580             Time Calculation    Start  Time 1048  -PC      Stop Time 1059  -PC      Time Calculation (min) 11 min  -PC      PT Received On 10/23/24  -PC                User Key  (r) = Recorded By, (t) = Taken By, (c) = Cosigned By      Initials Name Provider Type    PC Dee Chang, PT Physical Therapist                  Therapy Charges for Today       Code Description Service Date Service Provider Modifiers Qty    57992062949 HC PT THER PROC EA 15 MIN 10/23/2024 Dee Chang PT GP 1            PT G-Codes  AM-PAC 6 Clicks Score (PT): 24  PT Discharge Summary  Anticipated Discharge Disposition (PT): home    Dee Chang PT  10/23/2024

## 2024-10-23 NOTE — PROGRESS NOTES
MD ATTESTATION NOTE    The KARISSA and I have discussed this patient's history, physical exam, and treatment plan.  I have reviewed the documentation and personally had a face to face interaction with the patient. I affirm the documentation and agree with the treatment and plan.  The attached note describes my personal findings.      I provided a substantive portion of the care of the patient.  I personally performed the physical exam in its entirety, and below are my findings.       Brief HPI: Patient is observation with right foot pain.  No redness or swelling no fever or chills.  Noted to have elevated uric acid and ED.    PHYSICAL EXAM  ED Triage Vitals   Temp Heart Rate Resp BP SpO2   10/21/24 1510 10/21/24 1510 10/21/24 1510 10/21/24 1552 10/21/24 1510   100.3 °F (37.9 °C) 77 16 138/95 98 %      Temp src Heart Rate Source Patient Position BP Location FiO2 (%)   10/21/24 2100 10/21/24 1552 10/21/24 1552 10/21/24 1552 --   Oral Monitor Sitting Left arm          GENERAL: no acute distress  HENT: nares patent  EYES: no scleral icterus  CV: regular rhythm, normal rate  RESPIRATORY: normal effort  ABDOMEN: soft  MUSCULOSKELETAL: no deformity  NEURO: alert, moves all extremities, follows commands  PSYCH:  calm, cooperative  SKIN: warm, dry    Vital signs and nursing notes reviewed.        Plan: Podiatry consult.  Uric acid 9.8. Plan for MRI, Doppler ultrasound.

## 2024-10-23 NOTE — SIGNIFICANT NOTE
10/22/24 1416   Physical Therapy Time and Intention   Document Type evaluation   Mode of Treatment individual therapy;physical therapy   General Information   Patient Profile Reviewed yes   Prior Level of Function independent:  (No AD. Owns RW.)   Existing Precautions/Restrictions fall   Barriers to Rehab medically complex   Living Environment   People in Home alone  (Dtr able to check in.)   Home Main Entrance   Number of Stairs, Main Entrance three   Stair Railings, Main Entrance railings safe and in good condition;railings on both sides of stairs   Stairs Within Home, Primary   Number of Stairs, Within Home, Primary none   Pain   Pretreatment Pain Rating 0/10 - no pain   Posttreatment Pain Rating 8/10   Pain Location ankle;foot   Pain Side/Orientation right   Pain Management Interventions cold applied;nursing notified;premedicated for activity   Response to Pain Interventions activity participation with tolerable pain;activity participation with increased pain   Pre/Posttreatment Pain Comment Little to no pain at rest. With any weight bearing activity pain significantly increases.   Cognition   Orientation Status (Cognition) oriented x 4   Range of Motion Comprehensive   General Range of Motion no range of motion deficits identified   Strength Comprehensive (MMT)   General Manual Muscle Testing (MMT) Assessment lower extremity strength deficits identified   Comment, General Manual Muscle Testing (MMT) Assessment Generalized weakness. Grossly 4+/5.   Mobility   Extremity Weight-bearing Status right lower extremity   Right Lower Extremity (Weight-bearing Status) weight-bearing as tolerated (WBAT)  (CAM boot for comfort)   Bed Mobility   Bed Mobility supine-sit;sit-supine   Supine-Sit [place] (Bed Mobility) standby assist   Sit-Supine [place] (Bed Mobility) standby assist   Assistive Device (Bed Mobility) head of bed elevated   Comment, (Bed Mobility) Increased time to complete.   Sit-Stand Transfer    Sit-Stand [place] (Transfers) contact guard;minimum assist (75% patient effort);verbal cues   Assistive Device (Sit-Stand Transfers) walker, front-wheeled   Comment, (Sit-Stand Transfer) x2 from EOB, 1 with CAM and 1 w/o boot. Cues for hand placement and sequencing.   Gait/Stairs (Locomotion)   [place] Level (Gait) contact guard;minimum assist (75% patient effort);verbal cues   Assistive Device (Gait) walker, front-wheeled   Distance in Feet (Gait)   (2 R side steps, then 1 step fwd/bwd)   Deviations/Abnormal Patterns (Gait) gait speed decreased;antalgic   Bilateral Gait Deviations forward flexed posture;heel strike decreased   Right Sided Gait Deviations knee buckling, right side;weight shift ability decreased   Comment, (Gait/Stairs) Amb 2 R side steps w/ CAM boot, with cont pain at R ankle/foot and new pain at her calf where the boot was applying increased pressure. After seated rest pt wanting to trial amb w/o the boot to assess if pain is different. Pt able to take one step fwd/bwd and was buckling at her R knee requiring Nirav to safely lower back to bed.   Safety Issues/Impairments Affecting Functional Mobility   Safety Issues Affecting Function (Mobility) sequencing abilities   Impairments Affecting Function (Mobility) balance;endurance/activity tolerance;strength;pain   Comment, Safety Issues/Impairments (Mobility) Gait belt and non-skid socks donned.   Balance   Comment, Balance RLE buckling w/ mobility d/t pain. Requires Nirav to correct and maintain w/ use of RW. CG/Nirav w/ RW for standing. Mod-I to SBA for sitting balance on EOB.   Motor Skills   Therapeutic Exercise other (see comments)  (Encouraged gentle ankle ROM.)   Plan of Care Review   Plan of Care Reviewed With patient;daughter   Progress improving   Outcome Evaluation Pt is a 69 y.o. female with h/o HTN, OA, breast CA and L TKA 7/2024 who was admitted to [facility] on 10/21/2024 d/t R foot pain and inability to weight bear on it. Imaging (-)  for fxs, but (+) effusion and achilles tendinopathy. LE dopplar (-). Per Podiatry: rec CAM boot for ambulation. Patient is (I) at baseline, without use of an AD, owns a RW and lives alone in a house w/ 3 PATRICK; dtr can check on her. Today, pt performed bed mobility with SBA, required CGA for transfers with a RW, and ambulated 2 R side steps w/ RW, CAM boot and CG/Nirav, then after a seated rest break performed 1 step fwd/bwd w/o CAM boot, w/ RW and demo R knee buckling d/t increased pain at R ankle/foot. Pt required Nirav to correct/maintain her balance and safely lower back to sitting EOB. Once back in bed pts RLE was elevated and an ice pack applied. Pts CAM boot fits at her foot, but is too tight at her lower calf/ankle. Pt requesting to give time for swelling to decrease prior to ordering new/larger boot. Pt also requesting to stay an additional night for pain control and for swelling to further decrease; RN and APRN aware. Pt may benefit from skilled PT services acutely to address their impaired strength, balance and endurance, as well as, improve their level of independence prior to discharge. SBAR w/ RN. Rec HH and family assist upon DC.   Vital Signs   O2 Delivery Pre Treatment room air   O2 Delivery Intra Treatment room air   O2 Delivery Post Treatment room air   Pre Patient Position Supine   Intra Patient Position Standing   Post Patient Position Supine   Positioning and Restraints   Pre-Treatment Position in bed   Post Treatment Position bed   In Bed notified nsg;supine;fowlers;call light within reach;encouraged to call for assist;exit alarm on;with family/caregiver;side rails up x3;RUE elevated   Therapy Assessment/Plan (PT)   Rehab Potential (PT) good   Criteria for Skilled Interventions Met (PT) yes;meets criteria   Therapy Frequency (PT) 6 times/wk   Planned Therapy Interventions (PT) balance training;bed mobility training;gait training;home exercise program;patient/family  education;stretching;strengthening;stair training;ROM (range of motion);neuromuscular re-education;transfer training;postural re-education   Therapy Plan Review/Discharge Plan (PT)   Anticipated Discharge Disposition (PT) home with home health;home with assist   Bed Mobility Goal 1 (PT)   Activity/Assistive Device (Bed Mobility Goal 1, PT) bed mobility activities, all   [place] Level/Cues Needed (Bed Mobility Goal 1, PT) modified independence   Time Frame (Bed Mobility Goal 1, PT) 1 week   Transfer Goal 1 (PT)   Activity/Assistive Device (Transfer Goal 1, PT) transfers, all   [place] Level/Cues Needed (Transfer Goal 1, PT) modified independence   Time Frame (Transfer Goal 1, PT) 1 week   Gait Training Goal 1 (PT)   Activity/Assistive Device (Gait Training Goal 1, PT) gait (walking locomotion)   [place] Level (Gait Training Goal 1, PT) modified independence   Distance (Gait Training Goal 1, PT) 80   Time Frame (Gait Training Goal 1, PT) 1 week   Stairs Goal 1 (PT)   Activity/Assistive Device (Stairs Goal 1, PT) stairs, all skills   [place] Level/Cues Needed (Stairs Goal 1, PT) contact guard required   Number of Stairs (Stairs Goal 1, PT) 3   Time Frame (Stairs Goal 1, PT) 1 week

## 2024-10-23 NOTE — PLAN OF CARE
Goal Outcome Evaluation:      Pt is a 70 yo female continuing observation for right foot pain. Pt denies any pain at rest at this time. No redness or swelling seen. Diclofenac gel applied to right foot per order guidelines. Pt says that she cannot bear weight on right foot or she will have severe pain. Purewick in place. Perineal care performed. A&O x4. RA. X2 assist. Right arm restricted from breast cancer history with lymph node removal. PT consulted. Pt has no further questions or complaints at this time.

## 2024-10-24 NOTE — CASE MANAGEMENT/SOCIAL WORK
Case Management Discharge Note      Final Note: home with [facility]    Provided Post Acute Provider Quality & Resource List?: Yes  Post Acute Provider Quality and Resource List: Home Health  Delivered To: Patient  Method of Delivery: In person    Selected Continued Care - Discharged on 10/23/2024 Admission date: 10/21/2024 - Discharge disposition: Home or Self Care      Destination    No services have been selected for the patient.                Durable Medical Equipment    No services have been selected for the patient.                Dialysis/Infusion    No services have been selected for the patient.                Home Medical Care Coordination complete.      Service Provider Services Address Phone Fax Patient Preferred    [facility] [address] [place] [phone] --              Therapy    No services have been selected for the patient.                Community Resources    No services have been selected for the patient.                Community & DME    No services have been selected for the patient.                         Final Discharge Disposition Code: 06 - home with home health care

## (undated) DEVICE — STPLR SKIN VISISTAT WD 35CT

## (undated) DEVICE — NEEDLE, QUINCKE, 20GX3.5": Brand: MEDLINE

## (undated) DEVICE — DRESSING,GAUZE,XEROFORM,CURAD,1"X8",ST: Brand: CURAD

## (undated) DEVICE — BNDG,ELSTC,MATRIX,STRL,4"X5YD,LF,HOOK&LP: Brand: MEDLINE

## (undated) DEVICE — THE STERILE LIGHT HANDLE COVER IS USED WITH STERIS SURGICAL LIGHTING AND VISUALIZATION SYSTEMS.

## (undated) DEVICE — GLV SURG BIOGEL LTX PF 7 1/2

## (undated) DEVICE — KT DRN EVAC WND PVC PCH WTROC RND 10F400

## (undated) DEVICE — CEMENT MIXING SYSTEM WITH FEMORAL BREAKWAY NOZZLE: Brand: REVOLUTION

## (undated) DEVICE — APPL CHLORAPREP HI/LITE 26ML ORNG

## (undated) DEVICE — DRAPE,REIN 53X77,STERILE: Brand: MEDLINE

## (undated) DEVICE — PENCL ES MEGADINE EZ/CLEAN BUTN W/HOLSTR 10FT

## (undated) DEVICE — PK KN TOTL 40

## (undated) DEVICE — PAD,ABDOMINAL,8"X10",ST,LF: Brand: MEDLINE

## (undated) DEVICE — DECANTER BAG 9": Brand: MEDLINE INDUSTRIES, INC.

## (undated) DEVICE — UNDERCAST PADDING: Brand: DEROYAL

## (undated) DEVICE — SYRINGE, LUER LOCK, 60ML: Brand: MEDLINE

## (undated) DEVICE — GLV SURG PREMIERPRO ORTHO LTX PF SZ7.5 BRN

## (undated) DEVICE — CONTAINER,SPECIMEN,OR STERILE,4OZ: Brand: MEDLINE

## (undated) DEVICE — DUAL CUT SAGITTAL BLADE

## (undated) DEVICE — ANTIBACTERIAL UNDYED BRAIDED (POLYGLACTIN 910), SYNTHETIC ABSORBABLE SUTURE: Brand: COATED VICRYL

## (undated) DEVICE — PATIENT RETURN ELECTRODE, SINGLE-USE, CONTACT QUALITY MONITORING, ADULT, WITH 9FT CORD, FOR PATIENTS WEIGING OVER 33LBS. (15KG): Brand: MEGADYNE